# Patient Record
Sex: FEMALE | Race: WHITE | HISPANIC OR LATINO | Employment: OTHER | URBAN - METROPOLITAN AREA
[De-identification: names, ages, dates, MRNs, and addresses within clinical notes are randomized per-mention and may not be internally consistent; named-entity substitution may affect disease eponyms.]

---

## 2023-03-09 ENCOUNTER — TELEPHONE (OUTPATIENT)
Dept: PRIMARY CARE | Facility: CLINIC | Age: 78
End: 2023-03-09
Payer: MEDICARE

## 2023-03-09 NOTE — TELEPHONE ENCOUNTER
Radiology called to verify what the pt needs to be getting done. They stated that Dr. Salinas ordered both:  MRI Breast Bilateral (self pay)  MRI Breast Unilateral with and without contrast of the Right    Radiology needs clarification of what needs done. They are requesting that MA calls pt directly to confirm to pt what she needs to schedule. Thank you.    [phone]

## 2023-06-15 ENCOUNTER — TELEPHONE (OUTPATIENT)
Dept: PRIMARY CARE | Facility: CLINIC | Age: 78
End: 2023-06-15
Payer: MEDICARE

## 2023-06-15 DIAGNOSIS — B37.0 ORAL THRUSH: Primary | ICD-10-CM

## 2023-06-15 RX ORDER — NYSTATIN 100000 [USP'U]/ML
500000 SUSPENSION ORAL 4 TIMES DAILY
Qty: 300 ML | Refills: 0 | Status: SHIPPED | OUTPATIENT
Start: 2023-06-15 | End: 2023-08-14

## 2023-08-25 LAB
CHOLESTEROL, TOTAL(NMR): 222 MG/DL (ref 100–199)
HDL-C(NMR): 64 MG/DL
HDL-P (TOTAL)(NMR): 37.2 UMOL/L
LDL AND HDL PARTICLES -DATA CONVERSION: ABNORMAL
LDL SIZE(NMR): 21.2 NM
LDL-C (NIH CALC)(NMR): 139 MG/DL (ref 0–99)
LDL-P(NMR): 1584 NMOL/L
LP-IR SCORE(NMR): 36
SMALL LDL-P(NMR): 518 NMOL/L
TRIGLYCERIDES(NMR): 107 MG/DL (ref 0–149)

## 2023-11-09 DIAGNOSIS — Z95.0 CARDIAC PACEMAKER IN SITU: Primary | ICD-10-CM

## 2023-11-09 DIAGNOSIS — I44.2 ATRIOVENTRICULAR BLOCK, COMPLETE (MULTI): ICD-10-CM

## 2023-12-01 ENCOUNTER — LAB (OUTPATIENT)
Dept: LAB | Facility: LAB | Age: 78
End: 2023-12-01
Payer: MEDICARE

## 2023-12-01 DIAGNOSIS — M81.0 AGE-RELATED OSTEOPOROSIS WITHOUT CURRENT PATHOLOGICAL FRACTURE: ICD-10-CM

## 2023-12-01 DIAGNOSIS — E78.5 HYPERLIPIDEMIA, UNSPECIFIED: Primary | ICD-10-CM

## 2023-12-01 DIAGNOSIS — R41.89 OTHER SYMPTOMS AND SIGNS INVOLVING COGNITIVE FUNCTIONS AND AWARENESS: ICD-10-CM

## 2023-12-01 LAB
ALBUMIN SERPL BCP-MCNC: 4 G/DL (ref 3.4–5)
ALP SERPL-CCNC: 56 U/L (ref 33–136)
ALT SERPL W P-5'-P-CCNC: 26 U/L (ref 7–45)
ANION GAP SERPL CALC-SCNC: 9 MMOL/L (ref 10–20)
AST SERPL W P-5'-P-CCNC: 22 U/L (ref 9–39)
BASOPHILS # BLD AUTO: 0.04 X10*3/UL (ref 0–0.1)
BASOPHILS NFR BLD AUTO: 0.7 %
BILIRUB SERPL-MCNC: 0.5 MG/DL (ref 0–1.2)
BUN SERPL-MCNC: 15 MG/DL (ref 6–23)
CALCIUM SERPL-MCNC: 9.4 MG/DL (ref 8.6–10.3)
CHLORIDE SERPL-SCNC: 105 MMOL/L (ref 98–107)
CO2 SERPL-SCNC: 29 MMOL/L (ref 21–32)
CREAT SERPL-MCNC: 0.72 MG/DL (ref 0.5–1.05)
EOSINOPHIL # BLD AUTO: 0.1 X10*3/UL (ref 0–0.4)
EOSINOPHIL NFR BLD AUTO: 1.7 %
ERYTHROCYTE [DISTWIDTH] IN BLOOD BY AUTOMATED COUNT: 13.8 % (ref 11.5–14.5)
GFR SERPL CREATININE-BSD FRML MDRD: 85 ML/MIN/1.73M*2
GLUCOSE SERPL-MCNC: 89 MG/DL (ref 74–99)
HCT VFR BLD AUTO: 40.6 % (ref 36–46)
HGB BLD-MCNC: 13.2 G/DL (ref 12–16)
IMM GRANULOCYTES # BLD AUTO: 0.01 X10*3/UL (ref 0–0.5)
IMM GRANULOCYTES NFR BLD AUTO: 0.2 % (ref 0–0.9)
LYMPHOCYTES # BLD AUTO: 1.14 X10*3/UL (ref 0.8–3)
LYMPHOCYTES NFR BLD AUTO: 19.1 %
MCH RBC QN AUTO: 33.5 PG (ref 26–34)
MCHC RBC AUTO-ENTMCNC: 32.5 G/DL (ref 32–36)
MCV RBC AUTO: 103 FL (ref 80–100)
MONOCYTES # BLD AUTO: 0.78 X10*3/UL (ref 0.05–0.8)
MONOCYTES NFR BLD AUTO: 13.1 %
NEUTROPHILS # BLD AUTO: 3.89 X10*3/UL (ref 1.6–5.5)
NEUTROPHILS NFR BLD AUTO: 65.2 %
NRBC BLD-RTO: 0 /100 WBCS (ref 0–0)
PLATELET # BLD AUTO: 234 X10*3/UL (ref 150–450)
POTASSIUM SERPL-SCNC: 4.1 MMOL/L (ref 3.5–5.3)
PROT SERPL-MCNC: 6.6 G/DL (ref 6.4–8.2)
RBC # BLD AUTO: 3.94 X10*6/UL (ref 4–5.2)
SODIUM SERPL-SCNC: 139 MMOL/L (ref 136–145)
WBC # BLD AUTO: 6 X10*3/UL (ref 4.4–11.3)

## 2023-12-01 PROCEDURE — 80053 COMPREHEN METABOLIC PANEL: CPT

## 2023-12-01 PROCEDURE — 85025 COMPLETE CBC W/AUTO DIFF WBC: CPT

## 2023-12-01 PROCEDURE — 36415 COLL VENOUS BLD VENIPUNCTURE: CPT

## 2023-12-04 ENCOUNTER — TELEMEDICINE (OUTPATIENT)
Dept: GASTROENTEROLOGY | Facility: CLINIC | Age: 78
End: 2023-12-04
Payer: MEDICARE

## 2023-12-04 DIAGNOSIS — R13.12 OROPHARYNGEAL DYSPHAGIA: Primary | ICD-10-CM

## 2023-12-04 PROBLEM — G90.9 AUTONOMIC NEUROPATHY: Status: ACTIVE | Noted: 2023-12-04

## 2023-12-04 PROBLEM — E04.1 THYROID NODULE: Status: ACTIVE | Noted: 2018-08-08

## 2023-12-04 PROBLEM — G25.0 ESSENTIAL TREMOR: Status: ACTIVE | Noted: 2023-12-04

## 2023-12-04 PROBLEM — R41.9 COGNITIVE COMPLAINTS: Status: ACTIVE | Noted: 2023-12-04

## 2023-12-04 PROBLEM — R12 HEARTBURN: Status: ACTIVE | Noted: 2023-12-04

## 2023-12-04 PROBLEM — R13.10 DYSPHAGIA: Status: ACTIVE | Noted: 2023-12-04

## 2023-12-04 PROBLEM — I44.2 COMPLETE ATRIOVENTRICULAR BLOCK (MULTI): Status: ACTIVE | Noted: 2021-06-09

## 2023-12-04 PROBLEM — I10 BENIGN ESSENTIAL HTN: Status: ACTIVE | Noted: 2023-12-04

## 2023-12-04 PROBLEM — R79.89 TROPONIN LEVEL ELEVATED: Status: ACTIVE | Noted: 2023-12-04

## 2023-12-04 PROBLEM — I44.30 PARTIAL AV BLOCK: Status: ACTIVE | Noted: 2023-12-04

## 2023-12-04 PROBLEM — H91.90 HEARING LOSS: Status: ACTIVE | Noted: 2020-10-13

## 2023-12-04 PROBLEM — G62.9 PERIPHERAL POLYNEUROPATHY: Status: ACTIVE | Noted: 2023-12-04

## 2023-12-04 PROBLEM — R94.6 ABNORMAL THYROID SCAN: Status: ACTIVE | Noted: 2023-12-04

## 2023-12-04 PROBLEM — R41.89 COGNITIVE CHANGE: Status: ACTIVE | Noted: 2019-09-24

## 2023-12-04 PROBLEM — Z95.0 PACEMAKER: Status: ACTIVE | Noted: 2023-12-04

## 2023-12-04 PROBLEM — R42 POSTURAL DIZZINESS: Status: ACTIVE | Noted: 2023-12-04

## 2023-12-04 PROBLEM — I44.7 LBBB (LEFT BUNDLE BRANCH BLOCK): Status: ACTIVE | Noted: 2023-12-04

## 2023-12-04 PROBLEM — I35.8 AORTIC VALVE SCLEROSIS: Status: ACTIVE | Noted: 2023-12-04

## 2023-12-04 PROBLEM — I45.5 SINUS PAUSE: Status: ACTIVE | Noted: 2018-10-12

## 2023-12-04 PROBLEM — M25.551 BILATERAL HIP PAIN: Status: ACTIVE | Noted: 2023-12-04

## 2023-12-04 PROBLEM — E04.9 GOITER: Status: ACTIVE | Noted: 2018-07-09

## 2023-12-04 PROBLEM — Z86.16 PERSONAL HISTORY OF COVID-19: Status: ACTIVE | Noted: 2023-12-04

## 2023-12-04 PROBLEM — R55 NEAR SYNCOPE: Status: ACTIVE | Noted: 2023-12-04

## 2023-12-04 PROBLEM — R55 SYNCOPE: Status: ACTIVE | Noted: 2018-10-11

## 2023-12-04 PROBLEM — R05.9 COUGH: Status: ACTIVE | Noted: 2023-12-04

## 2023-12-04 PROBLEM — R19.7 DIARRHEA: Status: ACTIVE | Noted: 2023-12-04

## 2023-12-04 PROBLEM — U09.9 POST-COVID CHRONIC COUGH: Status: ACTIVE | Noted: 2023-12-04

## 2023-12-04 PROBLEM — R00.0 WIDE-COMPLEX TACHYCARDIA: Status: ACTIVE | Noted: 2018-10-12

## 2023-12-04 PROBLEM — R42 LIGHTHEADEDNESS: Status: ACTIVE | Noted: 2023-12-04

## 2023-12-04 PROBLEM — M81.0 AGE-RELATED OSTEOPOROSIS WITHOUT CURRENT PATHOLOGICAL FRACTURE: Status: ACTIVE | Noted: 2019-09-24

## 2023-12-04 PROBLEM — R05.3 POST-COVID CHRONIC COUGH: Status: ACTIVE | Noted: 2023-12-04

## 2023-12-04 PROBLEM — M25.552 BILATERAL HIP PAIN: Status: ACTIVE | Noted: 2023-12-04

## 2023-12-04 PROBLEM — C82.92: Status: ACTIVE | Noted: 2023-12-04

## 2023-12-04 PROCEDURE — 99213 OFFICE O/P EST LOW 20 MIN: CPT | Performed by: REGISTERED NURSE

## 2023-12-04 RX ORDER — OMEPRAZOLE 20 MG/1
1 CAPSULE, DELAYED RELEASE ORAL
COMMUNITY
Start: 2023-03-28 | End: 2023-12-27 | Stop reason: ALTCHOICE

## 2023-12-04 RX ORDER — CALCIUM CARBONATE 500(1250)
TABLET ORAL
COMMUNITY

## 2023-12-04 RX ORDER — SCOLOPAMINE TRANSDERMAL SYSTEM 1 MG/1
PATCH, EXTENDED RELEASE TRANSDERMAL
COMMUNITY
Start: 2019-09-25

## 2023-12-04 RX ORDER — ROSUVASTATIN CALCIUM 10 MG/1
10 TABLET, COATED ORAL
COMMUNITY
Start: 2021-07-27

## 2023-12-04 RX ORDER — MELATONIN 10 MG
TABLET, SUBLINGUAL SUBLINGUAL
COMMUNITY

## 2023-12-04 RX ORDER — ASPIRIN 81 MG/1
81 TABLET ORAL 2 TIMES WEEKLY
COMMUNITY
Start: 2014-03-11

## 2023-12-04 RX ORDER — PYRIDOXINE HCL (VITAMIN B6) 250 MG
TABLET ORAL
COMMUNITY

## 2023-12-04 RX ORDER — CARBAMAZEPINE 100 MG/1
100 CAPSULE, EXTENDED RELEASE ORAL 2 TIMES DAILY
COMMUNITY
Start: 2021-09-07 | End: 2023-12-27 | Stop reason: ALTCHOICE

## 2023-12-04 RX ORDER — ALENDRONATE SODIUM 70 MG/1
TABLET ORAL
COMMUNITY
Start: 2023-11-06

## 2023-12-04 RX ORDER — MAGNESIUM GLUCONATE
POWDER (GRAM) MISCELLANEOUS
COMMUNITY
Start: 2017-09-22

## 2023-12-04 RX ORDER — VALACYCLOVIR HYDROCHLORIDE 500 MG/1
1 TABLET, FILM COATED ORAL 2 TIMES DAILY
COMMUNITY

## 2023-12-04 ASSESSMENT — ENCOUNTER SYMPTOMS
DIFFICULTY URINATING: 0
ENDOCRINE NEGATIVE: 1
COUGH: 0
MYALGIAS: 0
APPETITE CHANGE: 0
JOINT SWELLING: 0
EYES NEGATIVE: 1
EYE PAIN: 0
SHORTNESS OF BREATH: 0
NERVOUS/ANXIOUS: 0
ARTHRALGIAS: 0
UNEXPECTED WEIGHT CHANGE: 0

## 2023-12-04 NOTE — PROGRESS NOTES
"REASON FOR VISIT:  Patient states she had dysphagia for approximately one week after Thanksgiving and wanted to talk to provider. Same thing happened last Thanksgiving       HPI:  Harmeet Conner is a 78 y.o. female who presents for oropharyngeal dysphagia.  States that she had discomfort in the back of her throat and upper chest for approximately 10 days after Thanksgiving.  States that it hurt to swallow.  Her symptoms have now resolved.  She is no longer taking Prilosec.  Shestoppes a few months ago. The patient was seen in January 2023 for dysphagia and had an EGD and colonoscopy in March 2023.  She had a modified barium swallow study in April 2023.    States that she is feeling better.  States that she feels food sometimes getting stuck in the back of her throat under her jaw.  Denies esophageal dysphagia, regurgitation, heartburn, food impaction, epigastric pain, nausea or vomiting.    She denies NSAID use.  She does not use tobacco products or drink alcohol.  She does not drink caffeinated beverages eat fried food or spicy food.    Med list notable for - FOSAMAX     Labs   Lab Results   Component Value Date    WBC 6.0 12/01/2023    HGB 13.2 12/01/2023    HCT 40.6 12/01/2023     (H) 12/01/2023     12/01/2023     Lab Results   Component Value Date     12/01/2023    K 4.1 12/01/2023     12/01/2023    CO2 29 12/01/2023    BUN 15 12/01/2023    CREATININE 0.72 12/01/2023    GLUCOSE 89 12/01/2023    CALCIUM 9.4 12/01/2023      Lab Results   Component Value Date    ALKPHOS 56 12/01/2023    ALKPHOS 48 09/27/2021    ALKPHOS 57 09/09/2019    BILITOT 0.5 12/01/2023    BILITOT 0.4 09/27/2021    BILITOT 0.5 09/09/2019    PROT 6.6 12/01/2023    PROT 6.5 09/27/2021    PROT 6.2 (L) 05/11/2021    ALT 26 12/01/2023    ALT 21 09/27/2021    ALT 25 09/09/2019    AST 22 12/01/2023    AST 23 09/27/2021    AST 30 09/09/2019      No results found for: \"INR\"   No results found for: \"IRON\", \"TIBC\"   No results " "found for: \"FERRITIN\"   Lab Results   Component Value Date    TSH 1.89 08/27/2020      No fhx of CRC.     Prev endoscopic eval:     >> Colonoscopy - 3/14/23- Dr Orellana -  - Diverticulosis. There was no evidence of diverticular bleeding.                         - External and internal hemorrhoids.                         - The examination was otherwise normal on direct and                          retroflexion views.                         - No specimens collected.  Recommendation:                              - Resume previous diet.                         - Continue present medications.                         - No repeat colonoscopy.    >> EGD - 3/14/23- Dr Orellana -    - Normal duodenal bulb and second portion of the                          duodenum.                         - Gastritis. Biopsied.                         - Z-line regular, 35 cm from the incisors.                         - Normal esophagus. Biopsied.                         - The examination was otherwise normal.  Recommendation:        - Patient has a contact number available for                          emergencies. The signs and symptoms of potential                          delayed complications were discussed with the patient.                          Return to normal activities tomorrow. Written                          discharge instructions were provided to the patient.                         - Resume previous diet.                         - Continue present medications.                         - Await pathology results. If it is normal then can                          consider EMOT/ MBS    >> MODIFIED BARIUM SWALLOW:    4/25/23 -with pharyngeal speech evaluation    Diet recommendations/feeding strategies: Per the results of today's  MBSS, patient to continue baseline diet of regular consistencies and  thin liquids following swallow strategies listed below.  Compensatory Swallow Strategies:  -alternate consistencies  -add moisture to " problematic foods  -chew food thoroughly  -second dry swallow following solids  -follow reflux precautions     Suspect esophageal dysphagia due to upper-mid esophageal residue,  mild backflow and reduced bolus transit time following regular  solids. However, liquid wash was successful in clearing most of  residue.       REVIEW OF SYSTEMS    Review of Systems   Constitutional:  Negative for appetite change and unexpected weight change.   HENT:  Negative for mouth sores.    Eyes: Negative.  Negative for pain and visual disturbance.   Respiratory:  Negative for cough and shortness of breath.    Cardiovascular:  Negative for chest pain.   Endocrine: Negative.    Genitourinary:  Negative for difficulty urinating.   Musculoskeletal:  Negative for arthralgias, joint swelling and myalgias.   Skin:  Negative for rash.   Allergic/Immunologic: Negative for environmental allergies and food allergies.   Psychiatric/Behavioral:  The patient is not nervous/anxious.           Allergies   Allergen Reactions    Iodinated Contrast Media Unknown       Past Medical History:   Diagnosis Date    Encounter for immunization 10/07/2020    Encounter for immunization    Encounter for immunization 09/24/2021    Encounter for immunization    Lesion of sciatic nerve, right lower limb     Piriformis syndrome of right side    Other chest pain 03/18/2022    Atypical chest pain    Other chest pain 10/01/2021    Chest discomfort    Personal history of malignant neoplasm of breast     History of malignant neoplasm of breast    Personal history of non-Hodgkin lymphomas     History of non-Hodgkin's lymphoma    Personal history of other diseases of the circulatory system 03/18/2022    History of orthostatic hypotension    Personal history of other diseases of the musculoskeletal system and connective tissue     History of low back pain    Personal history of other diseases of the nervous system and sense organs     History of hearing loss    Personal  history of other diseases of the nervous system and sense organs     History of tinnitus    Personal history of other endocrine, nutritional and metabolic disease     History of hyperlipidemia    Personal history of other infectious and parasitic diseases 08/30/2019    History of herpes labialis    Unspecified atrioventricular block     Partial AV block       Past Surgical History:   Procedure Laterality Date    OTHER SURGICAL HISTORY  08/05/2019    Tonsillectomy    OTHER SURGICAL HISTORY  08/05/2019    Cholecystectomy    OTHER SURGICAL HISTORY  02/24/2022    Mastectomy    OTHER SURGICAL HISTORY  02/24/2022    Complete colonoscopy    OTHER SURGICAL HISTORY  02/24/2022    Pacemaker insertion    OTHER SURGICAL HISTORY  02/24/2022    Thoracotomy    OTHER SURGICAL HISTORY  08/27/2020    Colonoscopy       No family history on file.    Social History     Tobacco Use    Smoking status: Not on file    Smokeless tobacco: Not on file   Substance Use Topics    Alcohol use: Not on file       Current Outpatient Medications   Medication Sig Dispense Refill    alendronate (Fosamax) 70 mg tablet once per week in am, 8oz water, empty stomach, nothing by mouth and do not lie down for the next 30 minutes.      aspirin 81 mg EC tablet Take 1 tablet (81 mg) by mouth 2 times a week.      carBAMazepine (Carbatrol) 100 mg 12 hr capsule Take 1 capsule (100 mg) by mouth twice a day.      DOCOSAHEXAENOIC ACID ORAL Take 2 capsules by mouth once daily.      magnesium gluconate, bulk, powder Transdermal.      omeprazole (PriLOSEC) 20 mg DR capsule Take 1 capsule (20 mg) by mouth once daily in the morning. Take before meals.      rosuvastatin (Crestor) 10 mg tablet Take 1 tablet (10 mg) by mouth once daily.      scopolamine (Transderm-Scop) 1 mg over 3 days patch 3 day PLACE 1 PATCH EVERY 3 DAYS      VITAMIN B COMPLEX ORAL Take 1 capsule by mouth once daily.      amino acids-protein supplement tablet Take by mouth once daily.      calcium  carbonate (Oscal) 500 mg calcium (1,250 mg) tablet Take by mouth.      cholecalciferol, vitamin D3, 250 mcg (10,000 unit) tablet Take by mouth.      pyridoxine (Vitamin B-6) 250 mg tablet Take by mouth.      valACYclovir (Valtrex) 500 mg tablet Take 1 tablet (500 mg) by mouth 2 times a day.       No current facility-administered medications for this visit.       PHYSICAL EXAM:  There were no vitals taken for this visit.     Physical Exam     No physical exam was conducted today.  Patient is here for virtual telemedicine telephone visit.  She appears to be in no acute distress    ASSESSMENT    Dysphagia     PLAN    Eat slowly and chew food thoroughly at mealtime. Drink  2 to 4 ounces of liquid when you are eating.  Cut meat into small pieces.  Try to avoid eating hard crusty bread.  2.   Recommend purchasing over-the-counter Prilosec 20 mg and taking this medication 30 minutes before supper every day for 2 weeks.  If you are feeling better but I would recommend continuing the medication for 8 to 12 weeks and then stop.  3.  If you have recurrent episodes of difficulty swallowing, feeling of food getting stuck in youir chest.  Then call the office at [phone] and I will put in orders for an esophageal motility study that can be done at [facility]  4.  If you have an incident of food impaction when you are eating, can be painful and cannot breathe well and you need to call 911 and go to the nearest emergency room  5.  Spoke to patient for 22 minutes and 40 seconds for virtual telephone telemedicine visit today.  Chart review, prep and dictation 10 minutes-total time 32 minutes and 40 seconds              Jose Navarro MA      Date: 12/4/2023  Time: 12:43 PM

## 2023-12-26 ENCOUNTER — LAB (OUTPATIENT)
Dept: LAB | Facility: LAB | Age: 78
End: 2023-12-26
Payer: MEDICARE

## 2023-12-26 DIAGNOSIS — D51.8 OTHER VITAMIN B12 DEFICIENCY ANEMIAS: Primary | ICD-10-CM

## 2023-12-26 LAB
CRP SERPL-MCNC: 0.31 MG/DL
FOLATE SERPL-MCNC: >22.3 NG/ML
VIT B12 SERPL-MCNC: 706 PG/ML (ref 211–911)

## 2023-12-26 PROCEDURE — 86140 C-REACTIVE PROTEIN: CPT

## 2023-12-26 PROCEDURE — 82607 VITAMIN B-12: CPT

## 2023-12-26 PROCEDURE — 83090 ASSAY OF HOMOCYSTEINE: CPT | Mod: WAIVER OF LIABILITY ON FILE

## 2023-12-26 PROCEDURE — 36415 COLL VENOUS BLD VENIPUNCTURE: CPT

## 2023-12-26 PROCEDURE — 82746 ASSAY OF FOLIC ACID SERUM: CPT

## 2023-12-27 ENCOUNTER — OFFICE VISIT (OUTPATIENT)
Dept: PRIMARY CARE | Facility: CLINIC | Age: 78
End: 2023-12-27
Payer: MEDICARE

## 2023-12-27 VITALS
OXYGEN SATURATION: 97 % | HEART RATE: 78 BPM | DIASTOLIC BLOOD PRESSURE: 68 MMHG | RESPIRATION RATE: 16 BRPM | HEIGHT: 64 IN | SYSTOLIC BLOOD PRESSURE: 108 MMHG | TEMPERATURE: 97.8 F | BODY MASS INDEX: 20.49 KG/M2 | WEIGHT: 120 LBS

## 2023-12-27 DIAGNOSIS — Z00.00 ROUTINE GENERAL MEDICAL EXAMINATION AT HEALTH CARE FACILITY: Primary | ICD-10-CM

## 2023-12-27 DIAGNOSIS — R25.1 TREMOR: ICD-10-CM

## 2023-12-27 DIAGNOSIS — Z00.00 HEALTHCARE MAINTENANCE: ICD-10-CM

## 2023-12-27 DIAGNOSIS — N39.44 NOCTURNAL ENURESIS: ICD-10-CM

## 2023-12-27 LAB — HCYS SERPL-SCNC: 6.61 UMOL/L (ref 5–13.9)

## 2023-12-27 PROCEDURE — 3074F SYST BP LT 130 MM HG: CPT | Performed by: FAMILY MEDICINE

## 2023-12-27 PROCEDURE — 3078F DIAST BP <80 MM HG: CPT | Performed by: FAMILY MEDICINE

## 2023-12-27 PROCEDURE — G0439 PPPS, SUBSEQ VISIT: HCPCS | Performed by: FAMILY MEDICINE

## 2023-12-27 PROCEDURE — 1036F TOBACCO NON-USER: CPT | Performed by: FAMILY MEDICINE

## 2023-12-27 PROCEDURE — 1170F FXNL STATUS ASSESSED: CPT | Performed by: FAMILY MEDICINE

## 2023-12-27 RX ORDER — PROPRANOLOL HYDROCHLORIDE 20 MG/1
20 TABLET ORAL 2 TIMES DAILY
Qty: 60 TABLET | Refills: 5 | Status: SHIPPED | OUTPATIENT
Start: 2023-12-27 | End: 2024-06-24

## 2023-12-27 RX ORDER — ATENOLOL 25 MG/1
25 TABLET ORAL
COMMUNITY
Start: 2021-07-27

## 2023-12-27 ASSESSMENT — PATIENT HEALTH QUESTIONNAIRE - PHQ9
1. LITTLE INTEREST OR PLEASURE IN DOING THINGS: NOT AT ALL
SUM OF ALL RESPONSES TO PHQ9 QUESTIONS 1 AND 2: 0
2. FEELING DOWN, DEPRESSED OR HOPELESS: NOT AT ALL

## 2023-12-27 ASSESSMENT — ACTIVITIES OF DAILY LIVING (ADL)
DOING_HOUSEWORK: INDEPENDENT
TAKING_MEDICATION: INDEPENDENT
DRESSING: INDEPENDENT
GROCERY_SHOPPING: INDEPENDENT
BATHING: INDEPENDENT
MANAGING_FINANCES: INDEPENDENT

## 2023-12-27 ASSESSMENT — ENCOUNTER SYMPTOMS
DEPRESSION: 0
OCCASIONAL FEELINGS OF UNSTEADINESS: 0
LOSS OF SENSATION IN FEET: 0

## 2023-12-27 NOTE — PROGRESS NOTES
"Subjective     Patient ID: Harmeet Conner is a 78 y.o. female who presents for Medicare Annual Wellness Visit Subsequent.  HPI Has had three episodes where she has awakened from sleep due to having and episode of nocturnal enuresis.      Review of Systems    Objective     Vitals:    12/27/23 1036   BP: 108/68   BP Location: Right arm   Pulse: 78   Resp: 16   Temp: 36.6 °C (97.8 °F)   SpO2: 97%   Weight: 54.4 kg (120 lb)   Height: 1.626 m (5' 4\")        Current Outpatient Medications   Medication Instructions    alendronate (Fosamax) 70 mg tablet once per week in am, 8oz water, empty stomach, nothing by mouth and do not lie down for the next 30 minutes.    amino acids-protein supplement tablet oral, Daily RT    aspirin 81 mg, oral, 2 times weekly    atenolol (TENORMIN) 25 mg, oral, Daily RT    calcium carbonate (Oscal) 500 mg calcium (1,250 mg) tablet oral    carBAMazepine (CARBATROL) 100 mg, oral, 2 times daily    cholecalciferol, vitamin D3, 250 mcg (10,000 unit) tablet oral    DOCOSAHEXAENOIC ACID ORAL 2 capsules, oral, Daily RT    magnesium gluconate, bulk, powder Transdermal.    omeprazole (PriLOSEC) 20 mg DR capsule 1 capsule, oral, Daily before breakfast    pyridoxine (Vitamin B-6) 250 mg tablet oral    rosuvastatin (CRESTOR) 10 mg, oral, Daily RT    scopolamine (Transderm-Scop) 1 mg over 3 days patch 3 day PLACE 1 PATCH EVERY 3 DAYS    valACYclovir (Valtrex) 500 mg tablet 1 tablet, oral, 2 times daily    VITAMIN B COMPLEX ORAL 1 capsule, oral, Daily RT        Physical Exam  Constitutional:       General: She is not in acute distress.     Appearance: Normal appearance.   HENT:      Head: Normocephalic and atraumatic.      Right Ear: Tympanic membrane normal.      Left Ear: Tympanic membrane normal.      Nose: Nose normal.      Mouth/Throat:      Pharynx: Oropharynx is clear.   Eyes:      Conjunctiva/sclera: Conjunctivae normal.      Pupils: Pupils are equal, round, and reactive to light.   Neck:      Vascular: " CYSTO No carotid bruit.   Cardiovascular:      Rate and Rhythm: Normal rate and regular rhythm.      Heart sounds: Murmur heard.   Pulmonary:      Effort: Pulmonary effort is normal.      Breath sounds: Normal breath sounds.   Abdominal:      General: Bowel sounds are normal.      Palpations: Abdomen is soft.   Musculoskeletal:      Cervical back: Neck supple. No tenderness.   Skin:     General: Skin is warm and dry.   Neurological:      General: No focal deficit present.      Mental Status: She is alert.   Psychiatric:         Mood and Affect: Mood normal.         Behavior: Behavior normal.         Assessment/Plan   Problem List Items Addressed This Visit    None  Visit Diagnoses         Codes    Routine general medical examination at health care facility    -  Primary Z00.00    Healthcare maintenance     Z00.00    Nocturnal enuresis     N39.44    Relevant Orders    Referral to Urogynecology    Tremor     R25.1

## 2023-12-28 ENCOUNTER — HOSPITAL ENCOUNTER (OUTPATIENT)
Dept: CARDIOLOGY | Facility: HOSPITAL | Age: 78
Discharge: HOME | End: 2023-12-28
Payer: MEDICARE

## 2023-12-28 DIAGNOSIS — Z95.0 CARDIAC PACEMAKER IN SITU: ICD-10-CM

## 2023-12-28 DIAGNOSIS — I44.2 ATRIOVENTRICULAR BLOCK, COMPLETE (MULTI): ICD-10-CM

## 2023-12-28 PROCEDURE — 93296 REM INTERROG EVL PM/IDS: CPT

## 2023-12-28 PROCEDURE — 93294 REM INTERROG EVL PM/LDLS PM: CPT | Performed by: INTERNAL MEDICINE

## 2023-12-29 LAB — SCAN RESULT: NORMAL

## 2024-04-03 ENCOUNTER — HOSPITAL ENCOUNTER (OUTPATIENT)
Dept: CARDIOLOGY | Facility: HOSPITAL | Age: 79
Discharge: HOME | End: 2024-04-03
Payer: MEDICARE

## 2024-04-03 DIAGNOSIS — I44.2 ATRIOVENTRICULAR BLOCK, COMPLETE (MULTI): ICD-10-CM

## 2024-04-03 DIAGNOSIS — Z95.0 CARDIAC PACEMAKER IN SITU: ICD-10-CM

## 2024-04-03 PROCEDURE — 93296 REM INTERROG EVL PM/IDS: CPT

## 2024-04-03 PROCEDURE — 93294 REM INTERROG EVL PM/LDLS PM: CPT | Performed by: INTERNAL MEDICINE

## 2024-05-01 NOTE — PROGRESS NOTES
Patient ID: Harmeet Conner is a 78 y.o. female.    Oncology History Overview Note  1. 1961: NHL s/p 3600 centigray of radiation (cobalt) to the left chest during a 3 week period at the age of 15 years.  2. March 2000: Left breast: 3 cm invasive lobular carcinoma, %, RI 95%, HER2 2+, 1/6 LN+ (questionable focus in second LN), s/p MRM.  3. May 2000: AC x 4 followed by Taxol x 4 q 3 weeks adjuvant.  4. November 2000: Tamoxifen 20mg daily.  5. August 2005 - July 2010: Femara 2.5mg daily.  6. 2013 -23andme testing and was found to be negative for any significant genetic markers    Subjective    HPI  Chief Complaint: history of left  breast cancer and non-Hodgkin's lymphoma    Ms. Harmeet Littlejohn is a 77 y/o F presenting for follow up for a history of left breast cancer and non-Hodgkin's lymphoma. History notable for Non-Hodgkin lymphoma (cobalt rad to L chest 1961) early stage breast cancer (2000 : T2N1 ER+RI+ Her2- invasive ca L s/p mastectomy adjuvant ACT and endocrine tamoxifen. Additional history includes syncope w intermittent CHB, HTN, DLP, OP.     Patient was initially diagnosed with non-Hodgkin's lymphoma at age 15 in 1951 status post radiation alone. Then in March 2000, she developed left breat cancer.  At that time, it was a 3 cm invasive lobular carcinoma, %, RI 95%, HER2 2+ with 1/6  lymph nodes involved. She was diagnosed at [facility] and underwent a modified ratified mastectomy followed by Adriamycin plus Cytoxan for 4 cycles and then Taxol every 3 weeks for 4 cycles. She was started on tamoxifen in November 2000 and then switched  to Femara in August 2005 which she continued until July 2010. She did undergo 23 and Me genetic testing in 2013 that was negative.      Patient reports that she has had residual breathing discomfort post COVID 2.5 years ago. She also reports some dysphagia that she is currently being worked up for. She is on low-dose omeprazole. She also reports feeling more fatigued  "lately. She denies  any pain. No new lumps or bumps.      Interval History:   2024:  Patient presents for annual follow-up.  She reports that she has been feeling great.  She states that she has had no symptoms including chest pain, palpitations, shortness of breath, fevers, chills, unintentional weight loss or appetite changes, bone pain, nausea, vomiting, constipation, diarrhea, signs of bleeding, new lumps or bumps, changes with her breast or chest wall, edema or unusual headaches.  She states that her energy is good and that she remains active.  She reports that her dysphagia is just episodic and that her breathing has improved.    Patient has questions regarding gadolinium levels that she had tested in her blood that came back elevated and wondering if she should still continue breast MRIs based on these results.  She did not have her breast MRI as scheduled due to wanting to discuss this with us, it also appears per our system that she may be due for her right-sided screening mammogram as well.  Patient did see our onco-cardiologist in the past, she is aware that per that note she was recommended to follow-up in 1 year just for routine surveillance due to her history of radiation, patient states that she has no cardiac symptoms or concerns at this time.  We did discuss the importance of having her lipid levels monitored, routine exercise, healthy well-balanced diet, continuing not to smoke and limiting alcohol for heart health. She has her thyroid monitored routinely as well and follows with her primary provider routinely.     FHx: Maternal grandfather  of unknown cancer.     Social Hx: Never smoker.     Objective    Visit Vitals  /66 (BP Location: Right arm, Patient Position: Sitting, BP Cuff Size: Small adult)   Pulse 65   Temp 36.2 °C (97.2 °F) (Temporal)   Resp 16   Ht (S) 1.635 m (5' 4.37\")   Wt 54 kg (119 lb 0.8 oz)   SpO2 92%   BMI 20.20 kg/m²   Smoking Status Never   BSA 1.57 m² "       Physical Exam  Constitutional:       General: She is not in acute distress.     Appearance: Normal appearance.   HENT:      Head: Normocephalic.      Mouth/Throat:      Mouth: Mucous membranes are moist.      Pharynx: No oropharyngeal exudate or posterior oropharyngeal erythema.   Eyes:      General: No scleral icterus.     Pupils: Pupils are equal, round, and reactive to light.   Cardiovascular:      Rate and Rhythm: Normal rate and regular rhythm.   Pulmonary:      Effort: Pulmonary effort is normal. No respiratory distress.      Breath sounds: Normal breath sounds. No wheezing.   Chest:      Comments: Left breast is surgically removed. Left chest wall has no lumps or bumps, masses or skin rashes. Right breast is without firm nodules, skin dimpling or rashes. There is no nipple discharge. Right axillary area while laying down is slightly protruding but flatens when sitting up and is soft  Abdominal:      General: Abdomen is flat. Bowel sounds are normal. There is no distension.      Palpations: Abdomen is soft.      Tenderness: There is no abdominal tenderness.   Musculoskeletal:         General: Normal range of motion.      Cervical back: Normal range of motion and neck supple.   Skin:     General: Skin is warm and dry.   Neurological:      General: No focal deficit present.      Mental Status: She is alert and oriented to person, place, and time. Mental status is at baseline.      Motor: No weakness.      Gait: Gait normal.   Psychiatric:         Mood and Affect: Mood normal.         Behavior: Behavior normal.         Judgment: Judgment normal.       Assessment/Plan     1. Left breast invasive lobular carcinoma, 3 cm, %, ND 95, HER2 2+ with 1/6 lymph nodes     - Diagnosed in March 2000, status post modified radical mastectomy. AC x4 and Taxol every 3 weeks x4 completed in May 2000. Tamoxifen started November 2000. Completed Femara 2.5 from August 2005 to July 2010.   - Due to dense tissue she has  been alternating between breast MRI and mammograms every 6 months.   - Discussed given she is 20 years out from initial diagnosis and even longer from her history of radiation back in 1961 for non-Hodgkin's lymphoma (3600 centigrade), we will plan to do MRI of the breast once a year and mammograms likely every 2 years.  She was agreeable to this plan.  - She does have some SOB and would like a Pulmonology referral which we will place today with Dr. Willis. She is already followed by Dr. Figueredo of Cardiology.  - She does have a good understanding of her possible complications from large dose radiation in the past. She should have an annual lipid panel.  - RTC in 1 year with Juancho after breast MRI.        5/2/2024:  - Patient presents for annual follow-up  - There is no evidence of breast cancer recurrence today on clinical exam.   - She had routine labs done in December 2023, we will hold off on any additional blood work at this time  - Before proceeding with another breast MRI patient would like me to discuss the risks versus benefits with Dr. Churchill since patient had outside testing done showing elevated MRI dye/gadolinium in her bloodstream  - Patient also due for routine right breast screening mammogram  - Patient reports feeling great and has no complaints or concerns at this time  - Patient would like to go ahead and schedule a follow-up visit with Dr. Thompson for ongoing cardiac care in the setting of past radiation  -Patient will return to clinic in 1 year but we will talk in the meantime to review testing which I will order once I discussed with Dr. Churchill    Addendum 5/10/2024: Called patient to discuss that I did speak with Dr. Churchill and updated her on patient's gadolinium levels.  Dr. Churchill stated that even despite patient's gadolinium levels she does not recommend ongoing breast MRIs due to length of time patient is from her initial diagnosis and in addition to this I also consulted with a colleague  from our breast surgery team who reviewed patient's history and past testing.  That provider also stated that she does not see an indication for patient to continue with breast MRIs.  I left this information on patient's voicemail (called 2x) and stated that I will order her routine screening right breast mammogram in addition to right axillary ultrasound if indicated due to soft tissue area protruding while patient is laying flat with arm outstretched above her, I do feel as though this area is benign but ultrasound entered in case it is needed.  I will call patient with her results once I receive them.  In addition Dr. Greenwood stated there is no additional information to share regarding patient's past radiation exposure other than routine follow-up care that I reviewed with the patient during her visit.     Diagnoses and all orders for this visit:  Malignant neoplasm of left breast in female, estrogen receptor positive, unspecified site of breast (Multi)  -     Clinic Appointment Request Follow up; JAMIL STEWART; Future  -     Referral to Cardiology; Future         HARLEY Sam-CNP

## 2024-05-02 ENCOUNTER — OFFICE VISIT (OUTPATIENT)
Dept: HEMATOLOGY/ONCOLOGY | Facility: CLINIC | Age: 79
End: 2024-05-02
Payer: MEDICARE

## 2024-05-02 ENCOUNTER — OFFICE VISIT (OUTPATIENT)
Dept: OBSTETRICS AND GYNECOLOGY | Facility: CLINIC | Age: 79
End: 2024-05-02
Payer: MEDICARE

## 2024-05-02 VITALS
BODY MASS INDEX: 20.32 KG/M2 | SYSTOLIC BLOOD PRESSURE: 111 MMHG | WEIGHT: 119 LBS | HEART RATE: 76 BPM | HEIGHT: 64 IN | DIASTOLIC BLOOD PRESSURE: 56 MMHG

## 2024-05-02 VITALS
HEART RATE: 65 BPM | TEMPERATURE: 97.2 F | DIASTOLIC BLOOD PRESSURE: 66 MMHG | RESPIRATION RATE: 16 BRPM | HEIGHT: 64 IN | SYSTOLIC BLOOD PRESSURE: 134 MMHG | OXYGEN SATURATION: 92 % | WEIGHT: 119.05 LBS | BODY MASS INDEX: 20.32 KG/M2

## 2024-05-02 DIAGNOSIS — C50.912 MALIGNANT NEOPLASM OF LEFT BREAST IN FEMALE, ESTROGEN RECEPTOR POSITIVE, UNSPECIFIED SITE OF BREAST (MULTI): Primary | ICD-10-CM

## 2024-05-02 DIAGNOSIS — N39.44 NOCTURNAL ENURESIS: ICD-10-CM

## 2024-05-02 DIAGNOSIS — N32.81 OVERACTIVE BLADDER: Primary | ICD-10-CM

## 2024-05-02 DIAGNOSIS — Z17.0 MALIGNANT NEOPLASM OF LEFT BREAST IN FEMALE, ESTROGEN RECEPTOR POSITIVE, UNSPECIFIED SITE OF BREAST (MULTI): Primary | ICD-10-CM

## 2024-05-02 LAB
POC APPEARANCE, URINE: CLEAR
POC BILIRUBIN, URINE: NEGATIVE
POC BLOOD, URINE: ABNORMAL
POC COLOR, URINE: YELLOW
POC GLUCOSE, URINE: NEGATIVE MG/DL
POC KETONES, URINE: NEGATIVE MG/DL
POC LEUKOCYTES, URINE: NEGATIVE
POC NITRITE,URINE: NEGATIVE
POC PH, URINE: 7 PH
POC PROTEIN, URINE: NEGATIVE MG/DL
POC SPECIFIC GRAVITY, URINE: 1.01
POC UROBILINOGEN, URINE: 0.2 EU/DL

## 2024-05-02 PROCEDURE — 1126F AMNT PAIN NOTED NONE PRSNT: CPT | Performed by: OBSTETRICS & GYNECOLOGY

## 2024-05-02 PROCEDURE — 3074F SYST BP LT 130 MM HG: CPT | Performed by: OBSTETRICS & GYNECOLOGY

## 2024-05-02 PROCEDURE — 81003 URINALYSIS AUTO W/O SCOPE: CPT | Mod: QW | Performed by: OBSTETRICS & GYNECOLOGY

## 2024-05-02 PROCEDURE — 3078F DIAST BP <80 MM HG: CPT | Performed by: OBSTETRICS & GYNECOLOGY

## 2024-05-02 PROCEDURE — 99214 OFFICE O/P EST MOD 30 MIN: CPT | Performed by: OBSTETRICS & GYNECOLOGY

## 2024-05-02 PROCEDURE — 99204 OFFICE O/P NEW MOD 45 MIN: CPT | Performed by: OBSTETRICS & GYNECOLOGY

## 2024-05-02 PROCEDURE — 1159F MED LIST DOCD IN RCRD: CPT | Performed by: OBSTETRICS & GYNECOLOGY

## 2024-05-02 PROCEDURE — 1126F AMNT PAIN NOTED NONE PRSNT: CPT

## 2024-05-02 PROCEDURE — 99214 OFFICE O/P EST MOD 30 MIN: CPT

## 2024-05-02 PROCEDURE — 3075F SYST BP GE 130 - 139MM HG: CPT

## 2024-05-02 PROCEDURE — 3078F DIAST BP <80 MM HG: CPT

## 2024-05-02 PROCEDURE — 1159F MED LIST DOCD IN RCRD: CPT

## 2024-05-02 RX ORDER — TROSPIUM CHLORIDE 20 MG/1
TABLET, FILM COATED ORAL
Qty: 60 TABLET | Refills: 3 | Status: SHIPPED | OUTPATIENT
Start: 2024-05-02

## 2024-05-02 ASSESSMENT — ENCOUNTER SYMPTOMS
TREMORS: 1
DIARRHEA: 1
TROUBLE SWALLOWING: 1

## 2024-05-02 ASSESSMENT — PAIN SCALES - GENERAL
PAINLEVEL: 0-NO PAIN
PAINLEVEL: 0-NO PAIN

## 2024-05-02 NOTE — PROGRESS NOTES
Urogynecology  Provider:  Lupis Adhikari MD  [phone]              ASSESSMENT AND PLAN:   79 y/o new patient presenting with complaints of urinary urgency and frequency.    Diagnoses:  #1 OAB    Plan:  OAB  - Discussed etiology of OAB and potential management options.  - Reviewed bladder health recommendations (fluid intake, avoiding bladder triggers).  - Discussed OAB treatment options such as lifestyle changes, PFPT, medications, PTNS, intradetrusor Botox injections, or SNM.   - We discussed that PFPT may help with bladder/pelvic floor restrengthening exercises with an overall goal to better control the bladder and improve urinary symptoms. PFPT includes attending sessions with a specialized licensed female pelvic floor physical therapist who does external with internal vaginal work to ensure she is doing the correct exercises to obtain the most benefit of physical therapy. We reviewed the importance of continuing these home exercises to receive maximum benefit from PFPT. They also teach mind over bladder strategies/urge suppression techniques to reduce the intensity of the urge to void.   - We discussed that with starting an OAB medication the goal would be to allow the detrusor muscle around the bladder to relax and prevent the muscles from spasm/squeezing too frequently to allow the bladder to store more urine which reduces the urge, frequency, and incontinent episodes.   - We briefly discussed third-line therapies we offer for OAB including PTNS which is an in-office weekly procedure x30 minutes of percutaneous tibial nerve stimulation procedure x12 weeks which is a time commitment, intradetrusor Botox injection in which we inject Botox to the muscle the controls the bladder to allow it to relax to provider OAB sx relief for up to 3-12 months but there is a possibility of urinary retention in which she would need to be amenable to learning how to CIC until the retention resolves over time as the  effects of Botox wears off, and sacral neuromodulation where we do a PNE trial placing a temporary lead into the S3 sacral nerve that goes to the bowel/bladder in office and the patient evaluates after 5-7 days if they get 50% improvement in her urinary symptoms we would proceed with stage 2 of permanently placing the SNM device in the OR.   - Patient is amenable to pursuing medication.  - Sent Rx of Trospium 20mg to her preferred pharmacy with instructions to take 1 pill po BID. This medication will help improve bladder compliance by decreasing intensity of the bladder spasms thus improving urinary urgency. Discussed the drug profile being an anticholinergic and possible medication side effects including dry mouth, dry eyes, and constipation.    Return in 2 months to follow-up with Mary Welch NP at [facility]. Then, 2 months from that, return to follow-up with Dr. Adhikari.    Scribe Attestation  By signing my name below, IIsac, Scotty, attest that this documentation has been prepared under the direction and in the presence of Lupis Adhikari MD on 05/02/2024 at 10:36 AM.    Agree with above. I Dr. Adhikari, personally performed the services described in the documentation which was scribed virtually and confirm it is both complete and accurate.  Lupis Adhikari MD        Problem List Items Addressed This Visit    None          I spent a total of 45 minutes in face to face and non face to face time.      Lupis Adhikari MD              HISTORY OF PRESENT ILLNESS:   Sent in consultation by Dr. Salinas     Urinary Symptoms:   - The patient regularly has nocturia multiple times nightly and occasional enuresis.  - She also complains of urinary urgency and frequency.  - She mentions that she used to consistently get bladder infections and another physician told her it was possible her  was not keeping himself as clean as he probably should be. We do not necessarily agree with this  argument. She did, however, mention that she did not have UTIs for quite some time after the end of that marriage but now she presents with another infection.             Past Medical History:       Past Medical History:   Diagnosis Date    Encounter for immunization 10/07/2020    Encounter for immunization    Encounter for immunization 09/24/2021    Encounter for immunization    Lesion of sciatic nerve, right lower limb     Piriformis syndrome of right side    Other chest pain 03/18/2022    Atypical chest pain    Other chest pain 10/01/2021    Chest discomfort    Personal history of malignant neoplasm of breast     History of malignant neoplasm of breast    Personal history of non-Hodgkin lymphomas     History of non-Hodgkin's lymphoma    Personal history of other diseases of the circulatory system 03/18/2022    History of orthostatic hypotension    Personal history of other diseases of the musculoskeletal system and connective tissue     History of low back pain    Personal history of other diseases of the nervous system and sense organs     History of hearing loss    Personal history of other diseases of the nervous system and sense organs     History of tinnitus    Personal history of other endocrine, nutritional and metabolic disease     History of hyperlipidemia    Personal history of other infectious and parasitic diseases 08/30/2019    History of herpes labialis    Unspecified atrioventricular block     Partial AV block          Past Surgical History:       Past Surgical History:   Procedure Laterality Date    CHOLECYSTECTOMY  2008?    OTHER SURGICAL HISTORY  08/05/2019    Tonsillectomy    OTHER SURGICAL HISTORY  08/05/2019    Cholecystectomy    OTHER SURGICAL HISTORY  02/24/2022    Mastectomy    OTHER SURGICAL HISTORY  02/24/2022    Complete colonoscopy    OTHER SURGICAL HISTORY  02/24/2022    Pacemaker insertion    OTHER SURGICAL HISTORY  02/24/2022    Thoracotomy    OTHER SURGICAL HISTORY  08/27/2020     Colonoscopy         Medications:       Prior to Admission medications    Medication Sig Start Date End Date Taking? Authorizing Provider   alendronate (Fosamax) 70 mg tablet once per week in am, 8oz water, empty stomach, nothing by mouth and do not lie down for the next 30 minutes. 11/6/23   Historical Provider, MD   amino acids-protein supplement tablet Take by mouth once daily.    Historical Provider, MD   aspirin 81 mg EC tablet Take 1 tablet (81 mg) by mouth 2 times a week. 3/11/14   Historical Provider, MD   atenolol (Tenormin) 25 mg tablet Take 1 tablet (25 mg) by mouth once daily. 7/27/21   Historical Provider, MD   calcium carbonate (Oscal) 500 mg calcium (1,250 mg) tablet Take by mouth.    Historical Provider, MD   cholecalciferol, vitamin D3, 250 mcg (10,000 unit) tablet Take by mouth.    Historical Provider, MD   DOCOSAHEXAENOIC ACID ORAL Take 2 capsules by mouth once daily. 3/11/14   Historical Provider, MD   magnesium gluconate, bulk, powder Transdermal. 9/22/17   Historical Provider, MD   propranolol (Inderal) 20 mg tablet Take 1 tablet (20 mg) by mouth 2 times a day. 12/27/23 6/24/24  Lilliana Salinas,    pyridoxine (Vitamin B-6) 250 mg tablet Take by mouth.    Historical Provider, MD   rosuvastatin (Crestor) 10 mg tablet Take 1 tablet (10 mg) by mouth once daily. 7/27/21   Historical Provider, MD   scopolamine (Transderm-Scop) 1 mg over 3 days patch 3 day PLACE 1 PATCH EVERY 3 DAYS 9/25/19   Historical Provider, MD   valACYclovir (Valtrex) 500 mg tablet Take 1 tablet (500 mg) by mouth 2 times a day.    Historical Provider, MD   VITAMIN B COMPLEX ORAL Take 1 capsule by mouth once daily. 9/22/17   Historical Provider, MD [place]  Review of Systems   HENT:  Positive for hearing loss and trouble swallowing.    Cardiovascular:         Heart murmur   Gastrointestinal:  Positive for diarrhea.   Genitourinary:  Positive for enuresis, frequency and urgency.   Neurological:  Positive for tremors.           PHYSICAL EXAM:      There were no vitals taken for this visit.     No LMP recorded.      Declines chaperone for physical exam.    PVR=     Well developed, well nourished, in no apparent distress.   Neurologic/Psychiatric:  Awake, Alert and Oriented times 3.  Affect normal. Normal cranial nerves  Pulm: breathing without effort  Sexual maturity: Lm stage V  Abd exam: soft, non-tender      GENITAL/URINARY:       External Genitalia:  The patient has normal appearing external genitalia, normal skenes and bartholins glands, and a normal hair distribution.  Her vulva is without lesions, erythema or discharge.  It is non-tender with appropriate sensation.     Urethral Meatus:  Size normal, Location normal, Lesions absent, Prolapse absent,      Urethra:  Fullness absent, Masses absent,      Bladder:  Fullness absent, Masses absent, Tenderness absent,      Vagina:  General appearance normal, Estrogen effect normal, Discharge absent, Lesions absent,      Cervix: Normal, no discharge.   Uterus:  normal size and mobile  Adnexa:   bilateral normal    POP-Q  The patient has no Prolapse.    POP-Q: 0  Position: standing    Aa: -3       Ba:  C: -0   Gh:  Pb:  TVL: 10         Ap: -3 Bp:  D: -10             She does not have myofascial tenderness on exam.     She has moderate vaginal atrophy.         Lupis Adhikari MD

## 2024-05-02 NOTE — LETTER
May 5, 2024     Lilliana Salinas DO  [address]  [address]  [facility] [address]    Patient: Harmeet Conner   YOB: 1945   Date of Visit: 5/2/2024       Dear Dr. Lilliana Salinas DO:    Thank you for referring Harmeet Conner to me for evaluation. Below are my notes for this consultation.  If you have questions, please do not hesitate to call me. I look forward to following your patient along with you.       Sincerely,     Lupis Adhikari MD      CC: No Recipients  ______________________________________________________________________________________    Urogynecology  Provider:  Lupis Adhikari MD  [phone]              ASSESSMENT AND PLAN:   79 y/o new patient presenting with complaints of urinary urgency and frequency.    Diagnoses:  #1 OAB    Plan:  OAB  - Discussed etiology of OAB and potential management options.  - Reviewed bladder health recommendations (fluid intake, avoiding bladder triggers).  - Discussed OAB treatment options such as lifestyle changes, PFPT, medications, PTNS, intradetrusor Botox injections, or SNM.   - We discussed that PFPT may help with bladder/pelvic floor restrengthening exercises with an overall goal to better control the bladder and improve urinary symptoms. PFPT includes attending sessions with a specialized licensed female pelvic floor physical therapist who does external with internal vaginal work to ensure she is doing the correct exercises to obtain the most benefit of physical therapy. We reviewed the importance of continuing these home exercises to receive maximum benefit from PFPT. They also teach mind over bladder strategies/urge suppression techniques to reduce the intensity of the urge to void.   - We discussed that with starting an OAB medication the goal would be to allow the detrusor muscle around the bladder to relax and prevent the muscles from spasm/squeezing too frequently to allow the bladder to store more urine which reduces the  urge, frequency, and incontinent episodes.   - We briefly discussed third-line therapies we offer for OAB including PTNS which is an in-office weekly procedure x30 minutes of percutaneous tibial nerve stimulation procedure x12 weeks which is a time commitment, intradetrusor Botox injection in which we inject Botox to the muscle the controls the bladder to allow it to relax to provider OAB sx relief for up to 3-12 months but there is a possibility of urinary retention in which she would need to be amenable to learning how to CIC until the retention resolves over time as the effects of Botox wears off, and sacral neuromodulation where we do a PNE trial placing a temporary lead into the S3 sacral nerve that goes to the bowel/bladder in office and the patient evaluates after 5-7 days if they get 50% improvement in her urinary symptoms we would proceed with stage 2 of permanently placing the SNM device in the OR.   - Patient is amenable to pursuing medication.  - Sent Rx of Trospium 20mg to her preferred pharmacy with instructions to take 1 pill po BID. This medication will help improve bladder compliance by decreasing intensity of the bladder spasms thus improving urinary urgency. Discussed the drug profile being an anticholinergic and possible medication side effects including dry mouth, dry eyes, and constipation.    Return in 2 months to follow-up with Mary Welch NP at [place]. Then, 2 months from that, return to follow-up with Dr. Adhikari.    Scribe Attestation  By signing my name below, IIsac Scribe, attest that this documentation has been prepared under the direction and in the presence of Lupis Adhikari MD on 05/02/2024 at 10:36 AM.      Problem List Items Addressed This Visit    None          I spent a total of 45 minutes in face to face and non face to face time.      Lupis Adhikari MD              HISTORY OF PRESENT ILLNESS:   Sent in consultation by Dr. Salinas     Urinary  Symptoms:   - The patient regularly has nocturia multiple times nightly and occasional enuresis.  - She also complains of urinary urgency and frequency.  - She mentions that she used to consistently get bladder infections and another physician told her it was possible her  was not keeping himself as clean as he probably should be. We do not necessarily agree with this argument. She did, however, mention that she did not have UTIs for quite some time after the end of that marriage but now she presents with another infection.             Past Medical History:       Past Medical History:   Diagnosis Date   • Encounter for immunization 10/07/2020    Encounter for immunization   • Encounter for immunization 09/24/2021    Encounter for immunization   • Lesion of sciatic nerve, right lower limb     Piriformis syndrome of right side   • Other chest pain 03/18/2022    Atypical chest pain   • Other chest pain 10/01/2021    Chest discomfort   • Personal history of malignant neoplasm of breast     History of malignant neoplasm of breast   • Personal history of non-Hodgkin lymphomas     History of non-Hodgkin's lymphoma   • Personal history of other diseases of the circulatory system 03/18/2022    History of orthostatic hypotension   • Personal history of other diseases of the musculoskeletal system and connective tissue     History of low back pain   • Personal history of other diseases of the nervous system and sense organs     History of hearing loss   • Personal history of other diseases of the nervous system and sense organs     History of tinnitus   • Personal history of other endocrine, nutritional and metabolic disease     History of hyperlipidemia   • Personal history of other infectious and parasitic diseases 08/30/2019    History of herpes labialis   • Unspecified atrioventricular block     Partial AV block          Past Surgical History:       Past Surgical History:   Procedure Laterality Date   •  CHOLECYSTECTOMY  2008?   • OTHER SURGICAL HISTORY  08/05/2019    Tonsillectomy   • OTHER SURGICAL HISTORY  08/05/2019    Cholecystectomy   • OTHER SURGICAL HISTORY  02/24/2022    Mastectomy   • OTHER SURGICAL HISTORY  02/24/2022    Complete colonoscopy   • OTHER SURGICAL HISTORY  02/24/2022    Pacemaker insertion   • OTHER SURGICAL HISTORY  02/24/2022    Thoracotomy   • OTHER SURGICAL HISTORY  08/27/2020    Colonoscopy         Medications:       Prior to Admission medications    Medication Sig Start Date End Date Taking? Authorizing Provider   alendronate (Fosamax) 70 mg tablet once per week in am, 8oz water, empty stomach, nothing by mouth and do not lie down for the next 30 minutes. 11/6/23   Historical Provider, MD   amino acids-protein supplement tablet Take by mouth once daily.    Historical Provider, MD   aspirin 81 mg EC tablet Take 1 tablet (81 mg) by mouth 2 times a week. 3/11/14   Historical Provider, MD   atenolol (Tenormin) 25 mg tablet Take 1 tablet (25 mg) by mouth once daily. 7/27/21   Historical Provider, MD   calcium carbonate (Oscal) 500 mg calcium (1,250 mg) tablet Take by mouth.    Historical Provider, MD   cholecalciferol, vitamin D3, 250 mcg (10,000 unit) tablet Take by mouth.    Historical Provider, MD   DOCOSAHEXAENOIC ACID ORAL Take 2 capsules by mouth once daily. 3/11/14   Historical Provider, MD   magnesium gluconate, bulk, powder Transdermal. 9/22/17   Historical Provider, MD   propranolol (Inderal) 20 mg tablet Take 1 tablet (20 mg) by mouth 2 times a day. 12/27/23 6/24/24  Lilliana Salinas,    pyridoxine (Vitamin B-6) 250 mg tablet Take by mouth.    Historical Provider, MD   rosuvastatin (Crestor) 10 mg tablet Take 1 tablet (10 mg) by mouth once daily. 7/27/21   Historical Provider, MD   scopolamine (Transderm-Scop) 1 mg over 3 days patch 3 day PLACE 1 PATCH EVERY 3 DAYS 9/25/19   Historical Provider, MD   valACYclovir (Valtrex) 500 mg tablet Take 1 tablet (500 mg) by mouth 2 times a  day.    Historical Provider, MD   VITAMIN B COMPLEX ORAL Take 1 capsule by mouth once daily. 9/22/17   Historical Provider, MD [place]  Review of Systems   HENT:  Positive for hearing loss and trouble swallowing.    Cardiovascular:         Heart murmur   Gastrointestinal:  Positive for diarrhea.   Genitourinary:  Positive for enuresis, frequency and urgency.   Neurological:  Positive for tremors.          PHYSICAL EXAM:      There were no vitals taken for this visit.     No LMP recorded.      Declines chaperone for physical exam.    PVR=     Well developed, well nourished, in no apparent distress.   Neurologic/Psychiatric:  Awake, Alert and Oriented times 3.  Affect normal. Normal cranial nerves  Pulm: breathing without effort  Sexual maturity: Lm stage V  Abd exam: soft, non-tender      GENITAL/URINARY:       External Genitalia:  The patient has normal appearing external genitalia, normal skenes and bartholins glands, and a normal hair distribution.  Her vulva is without lesions, erythema or discharge.  It is non-tender with appropriate sensation.     Urethral Meatus:  Size normal, Location normal, Lesions absent, Prolapse absent,      Urethra:  Fullness absent, Masses absent,      Bladder:  Fullness absent, Masses absent, Tenderness absent,      Vagina:  General appearance normal, Estrogen effect normal, Discharge absent, Lesions absent,      Cervix: Normal, no discharge.   Uterus:  normal size and mobile  Adnexa:   bilateral normal    POP-Q  The patient has no Prolapse.    POP-Q: 0  Position: standing    Aa: -3       Ba:  C: -0   Gh:  Pb:  TVL: 10         Ap: -3 Bp:  D: -10             She does not have myofascial tenderness on exam.     She has moderate vaginal atrophy.         Lupis Adhikari MD

## 2024-05-03 ENCOUNTER — APPOINTMENT (OUTPATIENT)
Dept: HEMATOLOGY/ONCOLOGY | Facility: CLINIC | Age: 79
End: 2024-05-03
Payer: MEDICARE

## 2024-05-03 ASSESSMENT — ENCOUNTER SYMPTOMS: FREQUENCY: 1

## 2024-05-08 ENCOUNTER — TELEPHONE (OUTPATIENT)
Dept: HEMATOLOGY/ONCOLOGY | Facility: CLINIC | Age: 79
End: 2024-05-08
Payer: MEDICARE

## 2024-05-08 DIAGNOSIS — Z12.31 SCREENING MAMMOGRAM, ENCOUNTER FOR: ICD-10-CM

## 2024-05-08 DIAGNOSIS — Z17.0 MALIGNANT NEOPLASM OF LEFT BREAST IN FEMALE, ESTROGEN RECEPTOR POSITIVE, UNSPECIFIED SITE OF BREAST (MULTI): Primary | ICD-10-CM

## 2024-05-08 DIAGNOSIS — C50.912 MALIGNANT NEOPLASM OF LEFT BREAST IN FEMALE, ESTROGEN RECEPTOR POSITIVE, UNSPECIFIED SITE OF BREAST (MULTI): Primary | ICD-10-CM

## 2024-05-08 NOTE — TELEPHONE ENCOUNTER
Called to speak with patient however she did not answer so I left a voicemail letting her know that I did consult with Dr. Churchill who instructed that we will not continue breast MRIs but we will do yearly mammograms of her right breast and that there is no additional follow-up recommendations related to her history of radiation other than what we discussed in our visit.  Also informed her I would order a right axillary ultrasound in case radiology felt it was necessary after her mammogram.  Instructed on voicemail for patient to call back if she has questions or concerns and the nurse can review this information however I am intermittently available today if she would like to speak with me.  I left our office callback number.

## 2024-05-09 ENCOUNTER — TELEPHONE (OUTPATIENT)
Dept: OBSTETRICS AND GYNECOLOGY | Facility: CLINIC | Age: 79
End: 2024-05-09
Payer: MEDICARE

## 2024-05-09 NOTE — TELEPHONE ENCOUNTER
----- Message from Harmeet Conner sent at 5/6/2024  3:31 PM EDT -----  Regarding: Quick f/u question from appt 5/2  Contact: [phone]  Dr. Adhikari, I noticed that my urinalysis results mentioned blood in the urine, and your notes mentioned that I presented with a new UTI.  I wasn't aware that I had a UTI, and was not experiencing symptoms.  Is this something that requires treatment?    Thank you.    Harmeet Conner

## 2024-05-09 NOTE — TELEPHONE ENCOUNTER
Result Communication      10:01 AM      Results were successfully communicated with the patient and they acknowledged their understanding that she does not have a UTI. The in office urinalysis was negative.

## 2024-06-03 ENCOUNTER — OFFICE VISIT (OUTPATIENT)
Dept: CARDIOLOGY | Facility: CLINIC | Age: 79
End: 2024-06-03
Payer: MEDICARE

## 2024-06-03 VITALS
SYSTOLIC BLOOD PRESSURE: 129 MMHG | WEIGHT: 117.28 LBS | BODY MASS INDEX: 20.13 KG/M2 | RESPIRATION RATE: 15 BRPM | DIASTOLIC BLOOD PRESSURE: 64 MMHG | HEART RATE: 64 BPM | OXYGEN SATURATION: 99 % | TEMPERATURE: 97.2 F

## 2024-06-03 DIAGNOSIS — C50.912 MALIGNANT NEOPLASM OF LEFT BREAST IN FEMALE, ESTROGEN RECEPTOR POSITIVE, UNSPECIFIED SITE OF BREAST (MULTI): ICD-10-CM

## 2024-06-03 DIAGNOSIS — Z17.0 MALIGNANT NEOPLASM OF LEFT BREAST IN FEMALE, ESTROGEN RECEPTOR POSITIVE, UNSPECIFIED SITE OF BREAST (MULTI): ICD-10-CM

## 2024-06-03 DIAGNOSIS — I35.8 AORTIC VALVE SCLEROSIS: Primary | ICD-10-CM

## 2024-06-03 DIAGNOSIS — Z95.0 PACEMAKER: ICD-10-CM

## 2024-06-03 DIAGNOSIS — C85.90 NON-HODGKIN'S LYMPHOMA, UNSPECIFIED BODY REGION, UNSPECIFIED NON-HODGKIN LYMPHOMA TYPE (MULTI): ICD-10-CM

## 2024-06-03 DIAGNOSIS — R55 NEAR SYNCOPE: ICD-10-CM

## 2024-06-03 DIAGNOSIS — I10 BENIGN ESSENTIAL HTN: ICD-10-CM

## 2024-06-03 PROBLEM — Z86.16 PERSONAL HISTORY OF COVID-19: Status: RESOLVED | Noted: 2023-12-04 | Resolved: 2024-06-03

## 2024-06-03 PROCEDURE — 99214 OFFICE O/P EST MOD 30 MIN: CPT | Performed by: INTERNAL MEDICINE

## 2024-06-03 PROCEDURE — 3078F DIAST BP <80 MM HG: CPT | Performed by: INTERNAL MEDICINE

## 2024-06-03 PROCEDURE — 1036F TOBACCO NON-USER: CPT | Performed by: INTERNAL MEDICINE

## 2024-06-03 PROCEDURE — 1160F RVW MEDS BY RX/DR IN RCRD: CPT | Performed by: INTERNAL MEDICINE

## 2024-06-03 PROCEDURE — 3074F SYST BP LT 130 MM HG: CPT | Performed by: INTERNAL MEDICINE

## 2024-06-03 PROCEDURE — 1159F MED LIST DOCD IN RCRD: CPT | Performed by: INTERNAL MEDICINE

## 2024-06-03 PROCEDURE — 1126F AMNT PAIN NOTED NONE PRSNT: CPT | Performed by: INTERNAL MEDICINE

## 2024-06-03 ASSESSMENT — ENCOUNTER SYMPTOMS
DIFFICULTY URINATING: 1
CONSTITUTIONAL NEGATIVE: 1
ARTHRALGIAS: 1
GASTROINTESTINAL NEGATIVE: 1
PSYCHIATRIC NEGATIVE: 1
CARDIOVASCULAR NEGATIVE: 1
HEMATOLOGIC/LYMPHATIC NEGATIVE: 1

## 2024-06-03 ASSESSMENT — PAIN SCALES - GENERAL: PAINLEVEL: 0-NO PAIN

## 2024-06-03 NOTE — PROGRESS NOTES
Patient:  Harmeet Conner  YOB: 1945  MRN: [id number]       Chief Complaint/Active Symptoms:       Harmeet Conner is a 78 y.o. female who returns today for cardiac follow-up.    Patient comes today for cardiovascular follow-up from their survivors clinic.  Since I saw her last she has gotten established in the breast cancer survivor clinic their notes and recommendations were reviewed.  She is going to be followed every other year with breast MRI.    The patient is active and has no chest pain, shortness of breath, dizziness or lightheadedness.  She has no concerning palpitations.  She did discuss her episodes of near syncope with electrophysiology and they made a rate to drop adjustment in her pacemaker to avoid symptoms of what they thought was possibly neurally mediated syncope.  She says since he made that change in her pacemaker programming she has had no further episodes.  We talked her about some of the basic mechanisms behind that and things she can do to avoid inciting that those such as not standing still in 1 position for prolonged periods of time and maintaining hydration.    Cancer Diagnosis: Breast cancer - [facility] treatment left breast ER/HI/HER2 +, Acx4, taxol x 4 q3 weeks in May 2000, tamoxifen since 11/2000, [id number] on Femara    Cancer Diagnosis: Non-Hodgkins lymphoma - age 15 - chest radiation 3600 cg to left chest over 3 weeks.       Review of Systems   Constitutional: Negative.    Cardiovascular: Negative.    Gastrointestinal: Negative.    Genitourinary:  Positive for difficulty urinating.   Musculoskeletal:  Positive for arthralgias.   Hematological: Negative.    Psychiatric/Behavioral: Negative.     All other systems reviewed and are negative.      Objective:     Vitals:    06/03/24 1531   BP: 129/64   Pulse: 64   Resp: 15   Temp: 36.2 °C (97.2 °F)   SpO2: 99%       Vitals:    06/03/24 1531   Weight: 53.2 kg (117 lb 4.6 oz)       Allergies:     Allergies   Allergen Reactions     Iodinated Contrast Media Unknown    Iodine Shortness of breath and Nausea/vomiting     Body turns read    Benztropine Confusion    Gabapentin Confusion    Paroxetine Hcl Other     Dystonia    Prochlorperazine Other     dystonia          Medications:     Current Outpatient Medications   Medication Instructions    alendronate (Fosamax) 70 mg tablet once per week in am, 8oz water, empty stomach, nothing by mouth and do not lie down for the next 30 minutes.    amino acids-protein supplement tablet oral, Daily RT    aspirin 81 mg, oral, 2 times weekly    atenolol (TENORMIN) 25 mg, oral, Daily RT    calcium carbonate (Oscal) 500 mg calcium (1,250 mg) tablet oral    cholecalciferol, vitamin D3, 250 mcg (10,000 unit) tablet oral    DOCOSAHEXAENOIC ACID ORAL 2 capsules, oral, Daily RT    magnesium gluconate, bulk, powder Transdermal.    propranolol (INDERAL) 20 mg, oral, 2 times daily    pyridoxine (Vitamin B-6) 250 mg tablet oral    rosuvastatin (CRESTOR) 10 mg, oral, Daily RT    scopolamine (Transderm-Scop) 1 mg over 3 days patch 3 day PLACE 1 PATCH EVERY 3 DAYS    trospium (Sanctura) 20 mg tablet 1/2 to 1 tab twice daily for frequency and urgency    valACYclovir (Valtrex) 500 mg tablet 1 tablet, oral, 2 times daily    VITAMIN B COMPLEX ORAL 1 capsule, oral, Daily RT       Physical Examination:   GENERAL:  Well developed, well nourished, in no acute distress.  HEENT: NC AT, EOMI with anicteric sclera  NECK:  Supple, no JVD, no bruit.  CHEST:  Symmetric and nontender.  Pacemaker site without erythema or erosion  LUNGS:  Clear to auscultation bilaterally, normal respiratory effort.  HEART:  PMI is nondisplaced. RRR with normal S1 and S2, no S3, no mumur or rub. No carotid or abdominal bruits  ABDOMEN: Soft, NT, ND without palpable organomegaly or bruits  EXTREMITIES:  Warm with good color, no clubbing or cyanosis.  There is no edema noted.  PERIPHERAL VASCULAR:  Pulses present and equally palpable; 2+  "throughout.  MUSCULOSKELETAL:   NEURO/PSYCH:  Alert and oriented times three with approppriate behavior and responses. Nonfocal motor examination with normal gait and ambulation  Lymph: No significant palpable lymphadenopathy  Skin: no rash or lesions on exposed skin or reported.    Lab:     CBC:   Lab Results   Component Value Date    WBC 6.0 12/01/2023    RBC 3.94 (L) 12/01/2023    HGB 13.2 12/01/2023    HCT 40.6 12/01/2023     12/01/2023        CMP:    Lab Results   Component Value Date     12/01/2023    K 4.1 12/01/2023     12/01/2023    CO2 29 12/01/2023    BUN 15 12/01/2023    CREATININE 0.72 12/01/2023    GLUCOSE 89 12/01/2023    CALCIUM 9.4 12/01/2023       Magnesium:    No results found for: \"MG\"    Lipid Profile:    Lab Results   Component Value Date    TRIG 95 12/27/2022    HDL 66.2 12/27/2022       TSH:    Lab Results   Component Value Date    TSH 1.89 08/27/2020       BNP:   No results found for: \"BNP\"     PT/INR:    No results found for: \"PROTIME\", \"INR\"    HgBA1c:    No results found for: \"HGBA1C\"    BMP:  Lab Results   Component Value Date     12/01/2023     09/27/2021     09/09/2019    K 4.1 12/01/2023    K 4.0 09/27/2021    K 4.0 09/09/2019     12/01/2023     09/27/2021     09/09/2019    CO2 29 12/01/2023    CO2 26 09/27/2021    CO2 28 09/09/2019    BUN 15 12/01/2023    BUN 18 09/27/2021    BUN 18 09/09/2019    CREATININE 0.72 12/01/2023    CREATININE 0.91 09/27/2021    CREATININE 0.72 09/09/2019       Cardiac Enzymes:    No results found for: \"TROPHS\"    Hepatic Function Panel:    Lab Results   Component Value Date    ALKPHOS 56 12/01/2023    ALT 26 12/01/2023    AST 22 12/01/2023    PROT 6.6 12/01/2023    BILITOT 0.5 12/01/2023         Diagnostic Studies:     No recent testing, electrophysiology and device checks reviewed    Radiology:     No orders to display   Last imaging reviewed    ASSESSMENT     Problem List Items Addressed This Visit  "      Aortic valve sclerosis - Primary    Relevant Orders    Follow Up In Cardiology    Benign essential HTN    Malignant neoplasm of breast (Multi)    Near syncope    Non-Hodgkin's lymphoma (Multi)    Pacemaker       PLAN     1.  History of breast cancer and non-Hodgkin's lymphoma with left and central chest radiation.  No signs of any coronary calcifications on CT scan last year and a normal nuclear stress test.  At this point in time despite any chemotherapy as outlined above there is no signs of any cardiotoxicity.  At this point in time I have no recommendations for any regular echocardiographic monitoring for her prior cancer treatment.  2.  Aortic valve sclerosis.  Her echocardiogram actually had gradients consistent with mild aortic stenosis.  Her murmur today is more suggestive of aortic stenosis that is very mild.  We recommend that in the absence of any change in symptoms or murmur we repeat her echocardiogram 4 years after her last 1.  Reviewed the signs and symptoms of aortic stenosis.  We suggested she see cardiology on an annual basis for physical examination if she is following with electrophysiology and they have no concerns she could see us in 2 years.  3.  Pacemaker with history of AV block.  Near syncope.  Her story is suggestive of neurally mediated syncope and she has responded to change in her pacemaker program that is aimed towards addressing the same.  We have encouraged hydration and no prolonged standing in 1 position at this time.  Will defer any changes in management to EP.  4.  Benign essential hypertension.  Blood pressures are controlled on her current medical regimen.    As mentioned we will see her in follow-up in a years time where she continues to follow with electrophysiology in 2 years.  She has symptoms or change in examination she can be referred back for an earlier appointment.

## 2024-07-02 ENCOUNTER — HOSPITAL ENCOUNTER (OUTPATIENT)
Dept: RADIOLOGY | Facility: CLINIC | Age: 79
Discharge: HOME | End: 2024-07-02
Payer: MEDICARE

## 2024-07-02 VITALS — HEIGHT: 64 IN | BODY MASS INDEX: 20.02 KG/M2 | WEIGHT: 117.28 LBS

## 2024-07-02 DIAGNOSIS — C50.912 MALIGNANT NEOPLASM OF LEFT BREAST IN FEMALE, ESTROGEN RECEPTOR POSITIVE, UNSPECIFIED SITE OF BREAST (MULTI): ICD-10-CM

## 2024-07-02 DIAGNOSIS — Z12.31 ENCOUNTER FOR SCREENING MAMMOGRAM FOR MALIGNANT NEOPLASM OF BREAST: ICD-10-CM

## 2024-07-02 DIAGNOSIS — Z17.0 MALIGNANT NEOPLASM OF LEFT BREAST IN FEMALE, ESTROGEN RECEPTOR POSITIVE, UNSPECIFIED SITE OF BREAST (MULTI): ICD-10-CM

## 2024-07-02 DIAGNOSIS — N63.15 UNSPECIFIED LUMP IN THE RIGHT BREAST, OVERLAPPING QUADRANTS: ICD-10-CM

## 2024-07-02 DIAGNOSIS — N63.10 UNSPECIFIED LUMP IN THE RIGHT BREAST, UNSPECIFIED QUADRANT: ICD-10-CM

## 2024-07-02 DIAGNOSIS — Z12.31 SCREENING MAMMOGRAM, ENCOUNTER FOR: ICD-10-CM

## 2024-07-02 PROCEDURE — G0279 TOMOSYNTHESIS, MAMMO: HCPCS | Mod: RIGHT SIDE | Performed by: STUDENT IN AN ORGANIZED HEALTH CARE EDUCATION/TRAINING PROGRAM

## 2024-07-02 PROCEDURE — 77061 BREAST TOMOSYNTHESIS UNI: CPT | Mod: RT

## 2024-07-02 PROCEDURE — 76981 USE PARENCHYMA: CPT | Mod: RT

## 2024-07-02 PROCEDURE — 76642 ULTRASOUND BREAST LIMITED: CPT | Mod: RIGHT SIDE | Performed by: STUDENT IN AN ORGANIZED HEALTH CARE EDUCATION/TRAINING PROGRAM

## 2024-07-02 PROCEDURE — 76642 ULTRASOUND BREAST LIMITED: CPT | Mod: RT

## 2024-07-02 PROCEDURE — 77065 DX MAMMO INCL CAD UNI: CPT | Mod: RIGHT SIDE | Performed by: STUDENT IN AN ORGANIZED HEALTH CARE EDUCATION/TRAINING PROGRAM

## 2024-07-05 ENCOUNTER — TELEPHONE (OUTPATIENT)
Dept: HEMATOLOGY/ONCOLOGY | Facility: CLINIC | Age: 79
End: 2024-07-05
Payer: MEDICARE

## 2024-07-05 NOTE — TELEPHONE ENCOUNTER
Per Juancho Anderson CNP results of mammogram and breast ultrasound reviewed, both resulted negative.     Called patient and updated her of negative test results. Patient appreciative of call and verbalized understanding. Pt had no further questions.

## 2024-07-08 ENCOUNTER — APPOINTMENT (OUTPATIENT)
Dept: OBSTETRICS AND GYNECOLOGY | Facility: CLINIC | Age: 79
End: 2024-07-08
Payer: MEDICARE

## 2024-07-08 VITALS
HEIGHT: 64 IN | SYSTOLIC BLOOD PRESSURE: 110 MMHG | BODY MASS INDEX: 19.97 KG/M2 | DIASTOLIC BLOOD PRESSURE: 60 MMHG | WEIGHT: 117 LBS

## 2024-07-08 DIAGNOSIS — N32.81 OVERACTIVE BLADDER: ICD-10-CM

## 2024-07-08 LAB
POC APPEARANCE, URINE: CLEAR
POC BILIRUBIN, URINE: NEGATIVE
POC BLOOD, URINE: NEGATIVE
POC COLOR, URINE: YELLOW
POC GLUCOSE, URINE: NEGATIVE MG/DL
POC KETONES, URINE: NEGATIVE MG/DL
POC LEUKOCYTES, URINE: NEGATIVE
POC NITRITE,URINE: NEGATIVE
POC PH, URINE: 5.5 PH
POC PROTEIN, URINE: NEGATIVE MG/DL
POC SPECIFIC GRAVITY, URINE: 1.02
POC UROBILINOGEN, URINE: 0.2 EU/DL

## 2024-07-08 PROCEDURE — 1036F TOBACCO NON-USER: CPT | Performed by: NURSE PRACTITIONER

## 2024-07-08 PROCEDURE — 81003 URINALYSIS AUTO W/O SCOPE: CPT | Performed by: NURSE PRACTITIONER

## 2024-07-08 PROCEDURE — 1160F RVW MEDS BY RX/DR IN RCRD: CPT | Performed by: NURSE PRACTITIONER

## 2024-07-08 PROCEDURE — 1159F MED LIST DOCD IN RCRD: CPT | Performed by: NURSE PRACTITIONER

## 2024-07-08 PROCEDURE — 3074F SYST BP LT 130 MM HG: CPT | Performed by: NURSE PRACTITIONER

## 2024-07-08 PROCEDURE — 3078F DIAST BP <80 MM HG: CPT | Performed by: NURSE PRACTITIONER

## 2024-07-08 PROCEDURE — 99212 OFFICE O/P EST SF 10 MIN: CPT | Performed by: NURSE PRACTITIONER

## 2024-07-08 NOTE — PROGRESS NOTES
"HISTORY OF PRESENT ILLNESS:  Harmeet Conner is a 78 y.o. female, who presents in follow up for a medication check for Trospium 20 mg BID.        During last encounter on 5/2/24, reviewed and agreed to the following: OAB - Started on Trospium 20 mg BID with Dr. Adhikari.     Today she reports   Urinary Symptoms:  - She likes the Trospium and takes 10 mg BID instead of 20 mg BID.   - Patient feels she is emptying her bladder.   - She was previously most bothered by frequency especially at nighttime. There has been occasions where she woke up wet with urine, but this hasn't happened recently. These episodes were sporadic (every few years).   - Denies constipation as a side effect of Trospium.   - She did have trace blood in the urine when she saw Dr. Adhikari. 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           PHYSICAL EXAMINATION:  No LMP recorded. Patient is postmenopausal.  Body mass index is 20.08 kg/m².  /60   Ht 1.626 m (5' 4\")   Wt 53.1 kg (117 lb)   BMI 20.08 kg/m²   General Appearance: well appearing  Neuro: Alert and oriented       IMPRESSION AND PLAN:  Harmeet Conner is a 78 y.o. who is being treated for OAB.     Plan    1. OAB  - She is satisfied with her improvement on Trospium and will continue at this time. Patient is taking Trospium 10 mg BID instead of 20 mg BID, which is managing her OAB symptoms.     2. Trace blood at prior visit on 5/2/24   - UA negative for blood today. No need for hematuria workup.   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       Follow up in 6 months with DEJON Blackburn and then yearly.     Scribe Attestation:   Ann-Marie MCKEON, am scribing for virtually, and in the presence of DEJON Blackburn on 7/8/24 at 12:50 PM.     Mary MCKEON" Rebekah, personally performed the services described in the documentation as scribed in my presence and confirm it is both complete and accurate.

## 2024-07-09 ENCOUNTER — HOSPITAL ENCOUNTER (OUTPATIENT)
Dept: CARDIOLOGY | Facility: HOSPITAL | Age: 79
Discharge: HOME | End: 2024-07-09
Payer: MEDICARE

## 2024-07-09 DIAGNOSIS — Z95.0 CARDIAC PACEMAKER IN SITU: ICD-10-CM

## 2024-07-09 DIAGNOSIS — I44.2 ATRIOVENTRICULAR BLOCK, COMPLETE (MULTI): ICD-10-CM

## 2024-07-09 PROCEDURE — 93296 REM INTERROG EVL PM/IDS: CPT

## 2024-07-15 DIAGNOSIS — R25.1 TREMOR: ICD-10-CM

## 2024-07-15 RX ORDER — PROPRANOLOL HYDROCHLORIDE 20 MG/1
20 TABLET ORAL 2 TIMES DAILY
Qty: 60 TABLET | Refills: 5 | Status: SHIPPED | OUTPATIENT
Start: 2024-07-15

## 2024-07-25 ENCOUNTER — OFFICE VISIT (OUTPATIENT)
Dept: PRIMARY CARE | Facility: CLINIC | Age: 79
End: 2024-07-25
Payer: MEDICARE

## 2024-07-25 VITALS
DIASTOLIC BLOOD PRESSURE: 64 MMHG | OXYGEN SATURATION: 99 % | HEIGHT: 64 IN | RESPIRATION RATE: 16 BRPM | TEMPERATURE: 97.8 F | SYSTOLIC BLOOD PRESSURE: 124 MMHG | WEIGHT: 119 LBS | BODY MASS INDEX: 20.32 KG/M2 | HEART RATE: 68 BPM

## 2024-07-25 DIAGNOSIS — G62.9 PERIPHERAL POLYNEUROPATHY: Primary | ICD-10-CM

## 2024-07-25 PROCEDURE — 1124F ACP DISCUSS-NO DSCNMKR DOCD: CPT | Performed by: FAMILY MEDICINE

## 2024-07-25 PROCEDURE — 3074F SYST BP LT 130 MM HG: CPT | Performed by: FAMILY MEDICINE

## 2024-07-25 PROCEDURE — 3078F DIAST BP <80 MM HG: CPT | Performed by: FAMILY MEDICINE

## 2024-07-25 PROCEDURE — 99214 OFFICE O/P EST MOD 30 MIN: CPT | Performed by: FAMILY MEDICINE

## 2024-07-25 PROCEDURE — 1159F MED LIST DOCD IN RCRD: CPT | Performed by: FAMILY MEDICINE

## 2024-07-25 RX ORDER — NORTRIPTYLINE HYDROCHLORIDE 25 MG/1
25 CAPSULE ORAL NIGHTLY
Qty: 30 CAPSULE | Refills: 5 | Status: SHIPPED | OUTPATIENT
Start: 2024-07-25 | End: 2025-01-21

## 2024-07-25 ASSESSMENT — ENCOUNTER SYMPTOMS
NUMBNESS: 1
PSYCHIATRIC NEGATIVE: 1
EYES NEGATIVE: 1
GASTROINTESTINAL NEGATIVE: 1
RESPIRATORY NEGATIVE: 1
CONSTITUTIONAL NEGATIVE: 1
CARDIOVASCULAR NEGATIVE: 1
ENDOCRINE NEGATIVE: 1

## 2024-07-25 ASSESSMENT — PATIENT HEALTH QUESTIONNAIRE - PHQ9
2. FEELING DOWN, DEPRESSED OR HOPELESS: NOT AT ALL
1. LITTLE INTEREST OR PLEASURE IN DOING THINGS: NOT AT ALL
SUM OF ALL RESPONSES TO PHQ9 QUESTIONS 1 AND 2: 0

## 2024-07-25 NOTE — PROGRESS NOTES
"Subjective     Patient ID: Harmeet Conner is a 78 y.o. female who presents for Peripheral Neuropathy (Recurrent feeling of sensation of tightness  and numbness in feet and ankles increased in the last 3 weeks.).  HPI Had a severe infection in her upper chest wall after a MOHS surgery.   Was placed on dicloxacillin and the symptoms occurred after this treatment.    She has a history of breast cancer. She was treated with chemotherapy that gave her a neuropathy. The tingling went away for the most part. She was left with an unusual sensation in the distribution of ankle socks. She has adjusted to this situation. Now she has a new neuropathy on top which is disruptive and annoying.     Review of Systems   Constitutional: Negative.    HENT: Negative.     Eyes: Negative.    Respiratory: Negative.     Cardiovascular: Negative.    Gastrointestinal: Negative.    Endocrine: Negative.    Genitourinary: Negative.    Skin: Negative.    Neurological:  Positive for numbness.   Psychiatric/Behavioral: Negative.         Objective     Vitals:    07/25/24 1206   BP: 124/64   BP Location: Left arm   Patient Position: Sitting   Pulse: 68   Resp: 16   Temp: 36.6 °C (97.8 °F)   SpO2: 99%   Weight: 54 kg (119 lb)   Height: 1.626 m (5' 4\")        Current Outpatient Medications   Medication Instructions    alendronate (Fosamax) 70 mg tablet once per week in am, 8oz water, empty stomach, nothing by mouth and do not lie down for the next 30 minutes.    amino acids-protein supplement tablet oral, Daily RT    aspirin 81 mg, oral, 2 times weekly    atenolol (TENORMIN) 25 mg, oral, Daily RT    calcium carbonate (Oscal) 500 mg calcium (1,250 mg) tablet oral    cholecalciferol, vitamin D3, 250 mcg (10,000 unit) tablet oral    DOCOSAHEXAENOIC ACID ORAL 2 capsules, oral, Daily RT    magnesium gluconate, bulk, powder Transdermal.    propranolol (INDERAL) 20 mg, oral, 2 times daily    pyridoxine (Vitamin B-6) 250 mg tablet oral    rosuvastatin (CRESTOR) " 10 mg, oral, Daily RT    scopolamine (Transderm-Scop) 1 mg over 3 days patch 3 day PLACE 1 PATCH EVERY 3 DAYS    trospium (Sanctura) 20 mg tablet 1/2 to 1 tab twice daily for frequency and urgency    valACYclovir (Valtrex) 500 mg tablet 1 tablet, oral, 2 times daily    VITAMIN B COMPLEX ORAL 1 capsule, oral, Daily RT        Physical Exam  Constitutional:       Appearance: Normal appearance.   HENT:      Head: Normocephalic and atraumatic.   Cardiovascular:      Rate and Rhythm: Normal rate and regular rhythm.   Pulmonary:      Effort: Pulmonary effort is normal.      Breath sounds: Normal breath sounds.   Skin:     General: Skin is warm and dry.   Neurological:      Mental Status: She is alert.      Comments: Decreased sensation on feet.          Assessment/Plan   Diagnoses and all orders for this visit:  Peripheral polyneuropathy  -     nortriptyline (Pamelor) 25 mg capsule; Take 1 capsule (25 mg) by mouth once daily at bedtime.           If it does not work in a week she may discontinue.   If the symptoms persist through labor day, will get an EMG

## 2024-08-19 ENCOUNTER — APPOINTMENT (OUTPATIENT)
Dept: PRIMARY CARE | Facility: CLINIC | Age: 79
End: 2024-08-19
Payer: MEDICARE

## 2024-08-19 VITALS
WEIGHT: 116.4 LBS | BODY MASS INDEX: 19.87 KG/M2 | RESPIRATION RATE: 16 BRPM | SYSTOLIC BLOOD PRESSURE: 124 MMHG | DIASTOLIC BLOOD PRESSURE: 78 MMHG | OXYGEN SATURATION: 98 % | HEART RATE: 97 BPM | TEMPERATURE: 97.5 F | HEIGHT: 64 IN

## 2024-08-19 DIAGNOSIS — R25.1 TREMOR: ICD-10-CM

## 2024-08-19 DIAGNOSIS — R13.12 OROPHARYNGEAL DYSPHAGIA: ICD-10-CM

## 2024-08-19 DIAGNOSIS — G62.9 PERIPHERAL POLYNEUROPATHY: Primary | ICD-10-CM

## 2024-08-19 PROCEDURE — 3074F SYST BP LT 130 MM HG: CPT | Performed by: FAMILY MEDICINE

## 2024-08-19 PROCEDURE — 99214 OFFICE O/P EST MOD 30 MIN: CPT | Performed by: FAMILY MEDICINE

## 2024-08-19 PROCEDURE — 1159F MED LIST DOCD IN RCRD: CPT | Performed by: FAMILY MEDICINE

## 2024-08-19 PROCEDURE — 3078F DIAST BP <80 MM HG: CPT | Performed by: FAMILY MEDICINE

## 2024-08-19 RX ORDER — ESOMEPRAZOLE MAGNESIUM 40 MG/1
40 CAPSULE, DELAYED RELEASE ORAL
Qty: 30 CAPSULE | Refills: 5 | Status: SHIPPED | OUTPATIENT
Start: 2024-08-19 | End: 2025-02-15

## 2024-08-19 RX ORDER — PROPRANOLOL HYDROCHLORIDE 40 MG/1
40 TABLET ORAL 2 TIMES DAILY
Qty: 60 TABLET | Refills: 3 | Status: SHIPPED | OUTPATIENT
Start: 2024-08-19

## 2024-08-19 NOTE — PROGRESS NOTES
"Subjective     Patient ID: Harmeet Conner is a 78 y.o. female who presents for Tremors.  HPI The peripheral neuropathy has NOT improved on Pamelor. Has some discoloration of feet as well. Has had tremors in her hands for years, but seem to all of a sudden getting worse. It is now an intention tremor. Noticing a slight decline in memory gaps.   She was seen by me in the recent past for dysphagia. She saw Gastro and they did an EGD. She had a modified barium swallow as well. Both were normal, but she was asymptomatic at the time.  She feels like pills, etc are getting stuck in the lower esophagus and not getting in to her stomach.      Review of Systems   Constitutional: Negative.    HENT: Negative.     Eyes: Negative.    Respiratory: Negative.     Cardiovascular: Negative.    Gastrointestinal:         Dysphagia   Endocrine: Negative.    Genitourinary: Negative.    Skin: Negative.    Neurological:  Positive for tremors and numbness.        Memory problems   Psychiatric/Behavioral: Negative.         Objective     Vitals:    08/19/24 1547   BP: 124/78   BP Location: Right arm   Patient Position: Sitting   Pulse: 97   Resp: 16   Temp: 36.4 °C (97.5 °F)   SpO2: 98%   Weight: 52.8 kg (116 lb 6.4 oz)   Height: 1.626 m (5' 4\")        Current Outpatient Medications   Medication Instructions    alendronate (Fosamax) 70 mg tablet once per week in am, 8oz water, empty stomach, nothing by mouth and do not lie down for the next 30 minutes.    amino acids-protein supplement tablet oral, Daily RT    aspirin 81 mg, oral, 2 times weekly    calcium carbonate (Oscal) 500 mg calcium (1,250 mg) tablet oral    cholecalciferol, vitamin D3, 250 mcg (10,000 unit) tablet oral    DOCOSAHEXAENOIC ACID ORAL 2 capsules, oral, Daily RT    esomeprazole (NEXIUM) 40 mg, oral, Daily before breakfast, Do not open capsule.    magnesium gluconate, bulk, powder Transdermal.    nortriptyline (PAMELOR) 25 mg, oral, Nightly    propranolol (INDERAL) 40 mg, oral, " 2 times daily    pyridoxine (Vitamin B-6) 250 mg tablet oral    rosuvastatin (CRESTOR) 10 mg, oral, Daily RT    scopolamine (Transderm-Scop) 1 mg over 3 days patch 3 day PLACE 1 PATCH EVERY 3 DAYS    trospium (Sanctura) 20 mg tablet 1/2 to 1 tab twice daily for frequency and urgency    valACYclovir (Valtrex) 500 mg tablet 1 tablet, oral, 2 times daily    VITAMIN B COMPLEX ORAL 1 capsule, oral, Daily RT        Physical Exam  Constitutional:       Appearance: Normal appearance.   HENT:      Head: Normocephalic and atraumatic.      Right Ear: Tympanic membrane normal.      Left Ear: Tympanic membrane normal.      Nose: Nose normal.      Mouth/Throat:      Mouth: Mucous membranes are moist.   Cardiovascular:      Rate and Rhythm: Normal rate and regular rhythm.   Pulmonary:      Effort: Pulmonary effort is normal.      Breath sounds: Normal breath sounds.   Musculoskeletal:         General: Normal range of motion.   Neurological:      General: No focal deficit present.      Mental Status: She is alert.         Assessment/Plan   Diagnoses and all orders for this visit:  Peripheral polyneuropathy  Tremor  -     propranolol (Inderal) 40 mg tablet; Take 1 tablet (40 mg) by mouth 2 times a day.  -     Referral to Neurology; Future  Oropharyngeal dysphagia  -     esomeprazole (NexIUM) 40 mg DR capsule; Take 1 capsule (40 mg) by mouth once daily in the morning. Take before meals. Do not open capsule.  -     Referral to Gastroenterology; Future

## 2024-08-20 ASSESSMENT — ENCOUNTER SYMPTOMS
TREMORS: 1
EYES NEGATIVE: 1
CONSTITUTIONAL NEGATIVE: 1
ROS GI COMMENTS: DYSPHAGIA
NUMBNESS: 1
PSYCHIATRIC NEGATIVE: 1
CARDIOVASCULAR NEGATIVE: 1
RESPIRATORY NEGATIVE: 1
ENDOCRINE NEGATIVE: 1

## 2024-09-03 DIAGNOSIS — G62.9 PERIPHERAL POLYNEUROPATHY: ICD-10-CM

## 2024-09-03 RX ORDER — NORTRIPTYLINE HYDROCHLORIDE 10 MG/1
CAPSULE ORAL
Qty: 60 CAPSULE | Refills: 5 | Status: SHIPPED | OUTPATIENT
Start: 2024-09-03

## 2024-09-05 ENCOUNTER — APPOINTMENT (OUTPATIENT)
Dept: OBSTETRICS AND GYNECOLOGY | Facility: CLINIC | Age: 79
End: 2024-09-05
Payer: MEDICARE

## 2024-09-24 ENCOUNTER — APPOINTMENT (OUTPATIENT)
Dept: CARDIOLOGY | Facility: CLINIC | Age: 79
End: 2024-09-24
Payer: MEDICARE

## 2024-09-24 VITALS
WEIGHT: 118.4 LBS | SYSTOLIC BLOOD PRESSURE: 118 MMHG | DIASTOLIC BLOOD PRESSURE: 62 MMHG | TEMPERATURE: 97.6 F | HEIGHT: 64 IN | HEART RATE: 61 BPM | BODY MASS INDEX: 20.22 KG/M2

## 2024-09-24 DIAGNOSIS — Z17.0 MALIGNANT NEOPLASM OF LEFT BREAST IN FEMALE, ESTROGEN RECEPTOR POSITIVE, UNSPECIFIED SITE OF BREAST: ICD-10-CM

## 2024-09-24 DIAGNOSIS — C50.912 MALIGNANT NEOPLASM OF LEFT BREAST IN FEMALE, ESTROGEN RECEPTOR POSITIVE, UNSPECIFIED SITE OF BREAST: ICD-10-CM

## 2024-09-24 DIAGNOSIS — I44.2 ATRIOVENTRICULAR BLOCK, COMPLETE (MULTI): ICD-10-CM

## 2024-09-24 DIAGNOSIS — Z95.0 CARDIAC PACEMAKER IN SITU: ICD-10-CM

## 2024-09-24 PROCEDURE — 1036F TOBACCO NON-USER: CPT | Performed by: INTERNAL MEDICINE

## 2024-09-24 PROCEDURE — 3074F SYST BP LT 130 MM HG: CPT | Performed by: INTERNAL MEDICINE

## 2024-09-24 PROCEDURE — 93280 PM DEVICE PROGR EVAL DUAL: CPT | Performed by: INTERNAL MEDICINE

## 2024-09-24 PROCEDURE — 99213 OFFICE O/P EST LOW 20 MIN: CPT | Performed by: INTERNAL MEDICINE

## 2024-09-24 PROCEDURE — 1159F MED LIST DOCD IN RCRD: CPT | Performed by: INTERNAL MEDICINE

## 2024-09-24 PROCEDURE — 3078F DIAST BP <80 MM HG: CPT | Performed by: INTERNAL MEDICINE

## 2024-09-24 NOTE — PROGRESS NOTES
"  Subjective:  The patient is a 78-year-old white female, followed primarily by Dr. Lilliana Salinas, who presents today for pacemaker follow-up.  She has a history of remote non-Hodgkin's lymphoma as a teenager, treated with chemotherapy and radiation.  She also had left breast cancer as an adult with partial mastectomy.  She has a history of a complete left bundle branch block that progressed to complete heart block by October 2018.  I implanted a Medtronic DDDR pacemaker at the request of [facility] electrophysiologist Dr. Benigno Pederson.  The patient has done well since then, except for occasional classic neurally-mediated syncope, typically in the setting of dehydration.  Last year, we programmed her \"rate drop response\" feature on, and she has been totally free of syncope since then.    The patient has a bachelor's degree from "[employer] and later earned a PhD in sociology, and taught at several colleges and universities.  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  She is apparently  and lives independently.  She exercises on a stationary bicycle for 20 minutes each day, down from her prior 30 minutes, since the exercise is rather boring.    Current Outpatient Medications   Medication Sig    alendronate (Fosamax) 70 mg tablet once per week in am, 8oz water, empty stomach, nothing by mouth and do not lie down for the next 30 minutes.    amino acids-protein supplement tablet Take by mouth once daily.    aspirin 81 mg EC tablet Take 1 tablet (81 mg) by mouth 2 times a week.    calcium carbonate (Oscal) 500 mg calcium (1,250 mg) tablet Take by mouth.    cholecalciferol, vitamin D3, 250 mcg (10,000 unit) tablet Take by mouth.    DOCOSAHEXAENOIC ACID ORAL Take 2 capsules by mouth once daily.    esomeprazole (NexIUM) 40 mg DR capsule Take 1 capsule (40 mg) by mouth once daily in the morning. Take before meals. Do not open capsule. "    magnesium gluconate, bulk, powder Transdermal.    nortriptyline (Pamelor) 10 mg capsule Take one to two capsules at bedtime    propranolol (Inderal) 40 mg tablet Take 1 tablet (40 mg) by mouth 2 times a day.    pyridoxine (Vitamin B-6) 250 mg tablet Take by mouth.    rosuvastatin (Crestor) 10 mg tablet Take 1 tablet (10 mg) by mouth once daily.    scopolamine (Transderm-Scop) 1 mg over 3 days patch 3 day PLACE 1 PATCH EVERY 3 DAYS    trospium (Sanctura) 20 mg tablet 1/2 to 1 tab twice daily for frequency and urgency    valACYclovir (Valtrex) 500 mg tablet Take 1 tablet (500 mg) by mouth 2 times a day.    VITAMIN B COMPLEX ORAL Take 1 capsule by mouth once daily.     Allergies:  Iodinated contrast media, Iodine, Benztropine, Gabapentin, Paroxetine hcl, and Prochlorperazine     Patient Active Problem List   Diagnosis    Abnormal thyroid scan    Age-related osteoporosis without current pathological fracture    Aortic valve sclerosis    Autonomic neuropathy    Benign essential HTN    Bilateral hip pain    Cognitive complaints    Cognitive change    Cough    Diarrhea    Dysphagia    Essential tremor    Hearing loss    Heartburn    LBBB (left bundle branch block)    Lightheadedness    Lymphoma, nodular, thorax (Multi)    Malignant neoplasm of breast (Multi)    Near syncope    Non-Hodgkin's lymphoma (Multi)    Osteoporosis    Pacemaker    Complete atrioventricular block (Multi)    Partial AV block    Peripheral polyneuropathy    Post-COVID chronic cough    Postural dizziness    Sinus pause    Syncope    Goiter    Thyroid nodule    Troponin level elevated    Wide-complex tachycardia     Past Surgical History:   Procedure Laterality Date    CHOLECYSTECTOMY  2008?    OTHER SURGICAL HISTORY  08/05/2019    Tonsillectomy    OTHER SURGICAL HISTORY  08/05/2019    Cholecystectomy    OTHER SURGICAL HISTORY Left 01/01/2000 2000 left breast mastectomy    OTHER SURGICAL HISTORY  02/24/2022    Complete colonoscopy    OTHER  "SURGICAL HISTORY  02/24/2022    Pacemaker insertion    OTHER SURGICAL HISTORY  02/24/2022    Thoracotomy    OTHER SURGICAL HISTORY  08/27/2020    Colonoscopy     Objective:  Vitals:    09/24/24 1421   BP: 118/62   Pulse: 61   Temp: 36.4 °C (97.6 °F)   Height:     1.626 m (5' 4\")  Weight: 53.7 kg (118 lb 6.4 oz)     Exam:  Gen: Thin woman in no distress; articulate, alert, and oriented.  HEENT: No scleral icterus; wearing glasses.  Neck: No jugular venous distention or thyromegaly.  Right subclavian pacemaker pocket: Normal to palpation.  Lungs: Clear to auscultation, with no wheezes or rales.  Heart: Regular rhythm with harsh grade 2/6 systolic ejection murmur and preserved S2.  Abdomen: Benign, with no organomegaly or masses.  Extremities: Intact distal pulses; no edema.  Neuro: No focal neurologic abnormalities.  Skin: No cutaneous lesions.    Device Check:  A Medtronic pacemaker check was done today.  The unit is programmed DDD from 60 bpm to 130 bpm, which provides over 17% atrial pacing and nearly 100% ventricular pacing.  The lead parameters were excellent and the battery longevity estimated at 6.3 years.  There was no burden of atrial fibrillation.    Impressions:  1.  Remote malignancies including non-Hodgkin's lymphoma and left breast cancer with old left mastectomy in 2000.  2.  Conduction system disease, perhaps secondary to remote chest radiation.  Her left bundle branch block progressed to complete heart block and she is now pacemaker-dependent.  3.  Status post Medtronic DDDR pacemaker in October 2018, with normal device function.  4.  Episodic neurally-mediated near-syncope, no with \"rate drop response\" programmed into her pacing system with some improvement in this problem.  5.  Aortic sclerosis without stenosis.    Recommendations:  1.  The patient's device will be checked remotely every 3 months.  2.  She will follow-up with electrophysiology on an annual basis.  3.  With any future left " ventricular dysfunction thought to be due to right ventricular pacing, she could conceivably be upgraded to a biventricular system.      Sunny Dumont MD

## 2024-10-10 ENCOUNTER — OFFICE VISIT (OUTPATIENT)
Dept: NEUROLOGY | Facility: CLINIC | Age: 79
End: 2024-10-10
Payer: MEDICARE

## 2024-10-10 VITALS
HEART RATE: 82 BPM | BODY MASS INDEX: 19.97 KG/M2 | DIASTOLIC BLOOD PRESSURE: 65 MMHG | SYSTOLIC BLOOD PRESSURE: 115 MMHG | HEIGHT: 64 IN | WEIGHT: 117 LBS

## 2024-10-10 DIAGNOSIS — Z13.89 SCREENING FOR NEUROLOGICAL CONDITION: ICD-10-CM

## 2024-10-10 DIAGNOSIS — G62.9 PERIPHERAL POLYNEUROPATHY: Primary | ICD-10-CM

## 2024-10-10 DIAGNOSIS — Z13.9 SCREENING FOR CONDITION: ICD-10-CM

## 2024-10-10 PROCEDURE — 3074F SYST BP LT 130 MM HG: CPT | Performed by: NURSE PRACTITIONER

## 2024-10-10 PROCEDURE — 1159F MED LIST DOCD IN RCRD: CPT | Performed by: NURSE PRACTITIONER

## 2024-10-10 PROCEDURE — 99214 OFFICE O/P EST MOD 30 MIN: CPT | Performed by: NURSE PRACTITIONER

## 2024-10-10 PROCEDURE — 3078F DIAST BP <80 MM HG: CPT | Performed by: NURSE PRACTITIONER

## 2024-10-10 PROCEDURE — 99204 OFFICE O/P NEW MOD 45 MIN: CPT | Performed by: NURSE PRACTITIONER

## 2024-10-10 PROCEDURE — 1036F TOBACCO NON-USER: CPT | Performed by: NURSE PRACTITIONER

## 2024-10-10 ASSESSMENT — ANXIETY QUESTIONNAIRES
2. NOT BEING ABLE TO STOP OR CONTROL WORRYING: NOT AT ALL
IF YOU CHECKED OFF ANY PROBLEMS ON THIS QUESTIONNAIRE, HOW DIFFICULT HAVE THESE PROBLEMS MADE IT FOR YOU TO DO YOUR WORK, TAKE CARE OF THINGS AT HOME, OR GET ALONG WITH OTHER PEOPLE: NOT DIFFICULT AT ALL
GAD7 TOTAL SCORE: 0
1. FEELING NERVOUS, ANXIOUS, OR ON EDGE: NOT AT ALL
6. BECOMING EASILY ANNOYED OR IRRITABLE: NOT AT ALL
3. WORRYING TOO MUCH ABOUT DIFFERENT THINGS: NOT AT ALL
5. BEING SO RESTLESS THAT IT IS HARD TO SIT STILL: NOT AT ALL
4. TROUBLE RELAXING: NOT AT ALL
7. FEELING AFRAID AS IF SOMETHING AWFUL MIGHT HAPPEN: NOT AT ALL

## 2024-10-10 ASSESSMENT — ENCOUNTER SYMPTOMS
LOSS OF SENSATION IN FEET: 0
OCCASIONAL FEELINGS OF UNSTEADINESS: 0
DEPRESSION: 0

## 2024-10-10 NOTE — PROGRESS NOTES
Patient being assessed today for initial evaluation of peripheral neuropathy.  She had experienced peripheral neuropathy after chemo that she received from breast cancer about 25 years ago.  In June she had a sudden onset of feeling like she has a very tight sock on both of her feet.  Her left foot actually even underwent some color changes in addition to the tight feeling.  It goes up to about the height of the tube sock.  The sensation is constant.  It does not affect her ability to ambulate nor her balance.  Intact to sensation of pinprick and cotton as well as hot and cold water.  With the sudden onset of this would like to get some labs to rule out possible reversible causes to include folate, B12, CBC, CMP.  Depending on those results may get a imaging completed as well.  Follow-up after labs completed.    This note was created with voice recognition software and was not corrected for typographical or grammatical errors

## 2024-10-18 DIAGNOSIS — N32.81 OVERACTIVE BLADDER: ICD-10-CM

## 2024-10-18 RX ORDER — TROSPIUM CHLORIDE 20 MG/1
TABLET, FILM COATED ORAL
Qty: 60 TABLET | Refills: 3 | Status: SHIPPED | OUTPATIENT
Start: 2024-10-18

## 2024-10-18 NOTE — TELEPHONE ENCOUNTER
Pharm[facility] called [facility] Patient last visit was 7/08/2024

## 2024-10-23 ENCOUNTER — LAB (OUTPATIENT)
Dept: LAB | Facility: LAB | Age: 79
End: 2024-10-23
Payer: MEDICARE

## 2024-10-23 DIAGNOSIS — M81.0 AGE-RELATED OSTEOPOROSIS WITHOUT CURRENT PATHOLOGICAL FRACTURE: ICD-10-CM

## 2024-10-23 DIAGNOSIS — Z13.89 SCREENING FOR NEUROLOGICAL CONDITION: ICD-10-CM

## 2024-10-23 DIAGNOSIS — E78.5 HYPERLIPIDEMIA, UNSPECIFIED: Primary | ICD-10-CM

## 2024-10-23 DIAGNOSIS — G62.9 PERIPHERAL POLYNEUROPATHY: ICD-10-CM

## 2024-10-23 DIAGNOSIS — Z13.9 SCREENING FOR CONDITION: ICD-10-CM

## 2024-10-23 DIAGNOSIS — R73.03 PREDIABETES: ICD-10-CM

## 2024-10-23 LAB
25(OH)D3 SERPL-MCNC: 73 NG/ML (ref 30–100)
ALBUMIN SERPL BCP-MCNC: 3.7 G/DL (ref 3.4–5)
ALP SERPL-CCNC: 41 U/L (ref 33–136)
ALT SERPL W P-5'-P-CCNC: 15 U/L (ref 7–45)
ANION GAP SERPL CALC-SCNC: 11 MMOL/L (ref 10–20)
AST SERPL W P-5'-P-CCNC: 19 U/L (ref 9–39)
BASOPHILS # BLD AUTO: 0.04 X10*3/UL (ref 0–0.1)
BASOPHILS NFR BLD AUTO: 0.7 %
BILIRUB SERPL-MCNC: 0.6 MG/DL (ref 0–1.2)
BUN SERPL-MCNC: 23 MG/DL (ref 6–23)
CALCIUM SERPL-MCNC: 9.1 MG/DL (ref 8.6–10.6)
CHLORIDE SERPL-SCNC: 104 MMOL/L (ref 98–107)
CO2 SERPL-SCNC: 32 MMOL/L (ref 21–32)
CREAT SERPL-MCNC: 0.76 MG/DL (ref 0.5–1.05)
CRP SERPL-MCNC: 0.45 MG/DL
EGFRCR SERPLBLD CKD-EPI 2021: 79 ML/MIN/1.73M*2
EOSINOPHIL # BLD AUTO: 0.14 X10*3/UL (ref 0–0.4)
EOSINOPHIL NFR BLD AUTO: 2.6 %
ERYTHROCYTE [DISTWIDTH] IN BLOOD BY AUTOMATED COUNT: 13.4 % (ref 11.5–14.5)
EST. AVERAGE GLUCOSE BLD GHB EST-MCNC: 111 MG/DL
FOLATE SERPL-MCNC: 23.3 NG/ML
GLUCOSE SERPL-MCNC: 91 MG/DL (ref 74–99)
HBA1C MFR BLD: 5.5 %
HCT VFR BLD AUTO: 40.4 % (ref 36–46)
HCYS SERPL-SCNC: 9.08 UMOL/L (ref 5–13.9)
HGB BLD-MCNC: 13.2 G/DL (ref 12–16)
IMM GRANULOCYTES # BLD AUTO: 0.01 X10*3/UL (ref 0–0.5)
IMM GRANULOCYTES NFR BLD AUTO: 0.2 % (ref 0–0.9)
INSULIN SERPL-ACNC: 3 UIU/ML (ref 3–25)
LYMPHOCYTES # BLD AUTO: 0.87 X10*3/UL (ref 0.8–3)
LYMPHOCYTES NFR BLD AUTO: 15.9 %
MCH RBC QN AUTO: 34.7 PG (ref 26–34)
MCHC RBC AUTO-ENTMCNC: 32.7 G/DL (ref 32–36)
MCV RBC AUTO: 106 FL (ref 80–100)
MONOCYTES # BLD AUTO: 0.76 X10*3/UL (ref 0.05–0.8)
MONOCYTES NFR BLD AUTO: 13.9 %
NEUTROPHILS # BLD AUTO: 3.64 X10*3/UL (ref 1.6–5.5)
NEUTROPHILS NFR BLD AUTO: 66.7 %
NRBC BLD-RTO: 0 /100 WBCS (ref 0–0)
PLATELET # BLD AUTO: 234 X10*3/UL (ref 150–450)
POTASSIUM SERPL-SCNC: 4.2 MMOL/L (ref 3.5–5.3)
PROT SERPL-MCNC: 6.2 G/DL (ref 6.4–8.2)
RBC # BLD AUTO: 3.8 X10*6/UL (ref 4–5.2)
SODIUM SERPL-SCNC: 143 MMOL/L (ref 136–145)
TSH SERPL-ACNC: 2.08 MIU/L (ref 0.44–3.98)
VIT B12 SERPL-MCNC: 891 PG/ML (ref 211–911)
WBC # BLD AUTO: 5.5 X10*3/UL (ref 4.4–11.3)

## 2024-10-23 PROCEDURE — 36415 COLL VENOUS BLD VENIPUNCTURE: CPT

## 2024-10-23 PROCEDURE — 85025 COMPLETE CBC W/AUTO DIFF WBC: CPT

## 2024-10-23 PROCEDURE — 83704 LIPOPROTEIN BLD QUAN PART: CPT

## 2024-10-23 PROCEDURE — 83695 ASSAY OF LIPOPROTEIN(A): CPT

## 2024-10-23 PROCEDURE — 83090 ASSAY OF HOMOCYSTEINE: CPT

## 2024-10-25 LAB — APO B100 SERPL-MCNC: 92 MG/DL (ref 60–117)

## 2024-10-28 LAB
CHOLEST SERPL-MCNC: 189 MG/DL (ref 100–199)
HDL SERPL-SCNC: 28.5 UMOL/L
HDLC SERPL-MCNC: 57 MG/DL
LDL SERPL QN: 21.8 NM
LDL SERPL-SCNC: 1387 NMOL/L
LDL SMALL SERPL-SCNC: 321 NMOL/L
LDLC SERPL CALC-MCNC: 117 MG/DL (ref 0–99)
LP-IR SCORE SERPL: <25
TRIGL SERPL-MCNC: 81 MG/DL (ref 0–149)

## 2024-10-31 ENCOUNTER — OFFICE VISIT (OUTPATIENT)
Dept: GASTROENTEROLOGY | Facility: CLINIC | Age: 79
End: 2024-10-31
Payer: MEDICARE

## 2024-10-31 VITALS
SYSTOLIC BLOOD PRESSURE: 118 MMHG | HEIGHT: 64 IN | BODY MASS INDEX: 19.81 KG/M2 | TEMPERATURE: 97.3 F | RESPIRATION RATE: 16 BRPM | DIASTOLIC BLOOD PRESSURE: 68 MMHG | HEART RATE: 67 BPM | WEIGHT: 116 LBS

## 2024-10-31 DIAGNOSIS — R13.12 OROPHARYNGEAL DYSPHAGIA: ICD-10-CM

## 2024-10-31 PROCEDURE — 1159F MED LIST DOCD IN RCRD: CPT | Performed by: NURSE PRACTITIONER

## 2024-10-31 PROCEDURE — 3078F DIAST BP <80 MM HG: CPT | Performed by: NURSE PRACTITIONER

## 2024-10-31 PROCEDURE — 99213 OFFICE O/P EST LOW 20 MIN: CPT | Performed by: NURSE PRACTITIONER

## 2024-10-31 PROCEDURE — 1036F TOBACCO NON-USER: CPT | Performed by: NURSE PRACTITIONER

## 2024-10-31 PROCEDURE — 3074F SYST BP LT 130 MM HG: CPT | Performed by: NURSE PRACTITIONER

## 2024-10-31 PROCEDURE — 1126F AMNT PAIN NOTED NONE PRSNT: CPT | Performed by: NURSE PRACTITIONER

## 2024-10-31 PROCEDURE — 99203 OFFICE O/P NEW LOW 30 MIN: CPT | Performed by: NURSE PRACTITIONER

## 2024-10-31 ASSESSMENT — ENCOUNTER SYMPTOMS
FEVER: 0
EYES NEGATIVE: 1
CHEST TIGHTNESS: 0
RESPIRATORY NEGATIVE: 1
FATIGUE: 0
CHILLS: 0
MUSCULOSKELETAL NEGATIVE: 1
SHORTNESS OF BREATH: 0
DIFFICULTY URINATING: 0
APNEA: 0
CARDIOVASCULAR NEGATIVE: 1
COUGH: 0
PSYCHIATRIC NEGATIVE: 1
HEMATOLOGIC/LYMPHATIC NEGATIVE: 1
ROS GI COMMENTS: SEE HPI
ALLERGIC/IMMUNOLOGIC NEGATIVE: 1
ENDOCRINE NEGATIVE: 1
WHEEZING: 0
DIAPHORESIS: 0
STRIDOR: 0
NEUROLOGICAL NEGATIVE: 1

## 2024-10-31 ASSESSMENT — PAIN SCALES - GENERAL: PAINLEVEL_OUTOF10: 0-NO PAIN

## 2024-11-11 ENCOUNTER — APPOINTMENT (OUTPATIENT)
Dept: OBSTETRICS AND GYNECOLOGY | Facility: CLINIC | Age: 79
End: 2024-11-11
Payer: MEDICARE

## 2024-11-11 VITALS
BODY MASS INDEX: 19.97 KG/M2 | SYSTOLIC BLOOD PRESSURE: 112 MMHG | DIASTOLIC BLOOD PRESSURE: 60 MMHG | WEIGHT: 117 LBS | HEIGHT: 64 IN

## 2024-11-11 DIAGNOSIS — N32.81 OVERACTIVE BLADDER: ICD-10-CM

## 2024-11-11 PROCEDURE — 99212 OFFICE O/P EST SF 10 MIN: CPT | Performed by: NURSE PRACTITIONER

## 2024-11-11 PROCEDURE — 1159F MED LIST DOCD IN RCRD: CPT | Performed by: NURSE PRACTITIONER

## 2024-11-11 PROCEDURE — 1160F RVW MEDS BY RX/DR IN RCRD: CPT | Performed by: NURSE PRACTITIONER

## 2024-11-11 PROCEDURE — 1126F AMNT PAIN NOTED NONE PRSNT: CPT | Performed by: NURSE PRACTITIONER

## 2024-11-11 PROCEDURE — 1036F TOBACCO NON-USER: CPT | Performed by: NURSE PRACTITIONER

## 2024-11-11 PROCEDURE — 3074F SYST BP LT 130 MM HG: CPT | Performed by: NURSE PRACTITIONER

## 2024-11-11 PROCEDURE — 3078F DIAST BP <80 MM HG: CPT | Performed by: NURSE PRACTITIONER

## 2024-11-11 RX ORDER — TROSPIUM CHLORIDE 20 MG/1
20 TABLET, FILM COATED ORAL 2 TIMES DAILY
Qty: 180 TABLET | Refills: 3 | Status: SHIPPED | OUTPATIENT
Start: 2024-11-11 | End: 2025-11-11

## 2024-11-11 ASSESSMENT — PAIN SCALES - GENERAL: PAINLEVEL_OUTOF10: 0-NO PAIN

## 2024-11-11 NOTE — PROGRESS NOTES
"HISTORY OF PRESENT ILLNESS:  Harmeet Conner is a 79 y.o. female, who presents in follow up for OAB.      During last encounter on 7/8/24, reviewed and agreed to the following: OAB - Trospium 10 mg BID was managing OAB. She was previously most bothered by frequency especially at nighttime, but Trospium helped. Trace blood at prior visit on 5/2/24 - UA negative for blood. No need for hematuria workup. 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      Today she reports   - She reports Trospium BID is still working for her. Patient corrects our record and states that she is taking 20 mg BID instead of 10 mg BID.   - Patient is able to sleep better throughout the night and stay dry.   - Denies side effects of Trospium.           PHYSICAL EXAMINATION:  No LMP recorded. Patient is postmenopausal.  Body mass index is 20.08 kg/m².  /60   Ht 1.626 m (5' 4\")   Wt 53.1 kg (117 lb)   BMI 20.08 kg/m²     Physical Exam  Constitutional:       Appearance: Normal appearance. She is normal weight.   Pulmonary:      Effort: Pulmonary effort is normal.   Neurological:      Mental Status: She is alert.   Psychiatric:         Mood and Affect: Mood normal.         Behavior: Behavior normal.         Thought Content: Thought content normal.         Judgment: Judgment normal.     IMPRESSION AND PLAN:  Harmeet Conner is a 79 y.o. who is being treated for OAB.     Plan    1. OAB  - Continue Trospium 20 mg BID as this is managing her OAB symptoms. Sent 90 day refills x1 year.   - She is satisfied with her bladder symptoms.      All questions and concerns were answered and addressed.  The patient expressed understanding and agrees with the plan.      Follow-up in 1 year or sooner PRN with Mary Welch, APRN-CNP.     Scribe Attestation:   I, Ann-Marie Baron, am scribing for virtually, and in the presence of Mary " DEJON Welch on 11/11/2024 at 11:26 AM.     I, Mary Welch, personally performed the services described in the documentation as scribed in my presence and confirm it is both complete and accurate.

## 2024-11-11 NOTE — PATIENT INSTRUCTIONS
"To reach the Urogynecology nurses for Dr. Anand and Mary Welch, call [phone]. You will then select option 2 to be connected to the your provider's office and then option 3 for \"[facility]\". This will connect you with Altagracia or Monet Muller.         "

## 2024-12-04 ENCOUNTER — APPOINTMENT (OUTPATIENT)
Dept: PRIMARY CARE | Facility: CLINIC | Age: 79
End: 2024-12-04
Payer: MEDICARE

## 2024-12-04 VITALS
OXYGEN SATURATION: 98 % | DIASTOLIC BLOOD PRESSURE: 68 MMHG | HEART RATE: 68 BPM | HEIGHT: 64 IN | RESPIRATION RATE: 16 BRPM | WEIGHT: 119 LBS | BODY MASS INDEX: 20.32 KG/M2 | SYSTOLIC BLOOD PRESSURE: 110 MMHG | TEMPERATURE: 97.9 F

## 2024-12-04 DIAGNOSIS — I44.2 COMPLETE ATRIOVENTRICULAR BLOCK (MULTI): ICD-10-CM

## 2024-12-04 DIAGNOSIS — Z13.220 SCREENING FOR HYPERLIPIDEMIA: ICD-10-CM

## 2024-12-04 DIAGNOSIS — Z00.00 MEDICARE ANNUAL WELLNESS VISIT, INITIAL: ICD-10-CM

## 2024-12-04 DIAGNOSIS — Z00.00 ROUTINE GENERAL MEDICAL EXAMINATION AT HEALTH CARE FACILITY: Primary | ICD-10-CM

## 2024-12-04 DIAGNOSIS — R53.81 PHYSICAL DEBILITY: ICD-10-CM

## 2024-12-04 DIAGNOSIS — I10 HTN (HYPERTENSION), BENIGN: ICD-10-CM

## 2024-12-04 PROCEDURE — 3074F SYST BP LT 130 MM HG: CPT | Performed by: FAMILY MEDICINE

## 2024-12-04 PROCEDURE — G0439 PPPS, SUBSEQ VISIT: HCPCS | Performed by: FAMILY MEDICINE

## 2024-12-04 PROCEDURE — 3078F DIAST BP <80 MM HG: CPT | Performed by: FAMILY MEDICINE

## 2024-12-04 PROCEDURE — 1170F FXNL STATUS ASSESSED: CPT | Performed by: FAMILY MEDICINE

## 2024-12-04 PROCEDURE — 1159F MED LIST DOCD IN RCRD: CPT | Performed by: FAMILY MEDICINE

## 2024-12-04 PROCEDURE — 1124F ACP DISCUSS-NO DSCNMKR DOCD: CPT | Performed by: FAMILY MEDICINE

## 2024-12-04 ASSESSMENT — ENCOUNTER SYMPTOMS
GASTROINTESTINAL NEGATIVE: 1
PSYCHIATRIC NEGATIVE: 1
ENDOCRINE NEGATIVE: 1
RESPIRATORY NEGATIVE: 1
LOSS OF SENSATION IN FEET: 0
DEPRESSION: 0
CONSTITUTIONAL NEGATIVE: 1
OCCASIONAL FEELINGS OF UNSTEADINESS: 0
CARDIOVASCULAR NEGATIVE: 1
EYES NEGATIVE: 1
MUSCULOSKELETAL NEGATIVE: 1
NEUROLOGICAL NEGATIVE: 1
ALLERGIC/IMMUNOLOGIC NEGATIVE: 1
HEMATOLOGIC/LYMPHATIC NEGATIVE: 1

## 2024-12-04 ASSESSMENT — ACTIVITIES OF DAILY LIVING (ADL)
TAKING_MEDICATION: INDEPENDENT
MANAGING_FINANCES: INDEPENDENT
BATHING: INDEPENDENT
DRESSING: INDEPENDENT
DOING_HOUSEWORK: INDEPENDENT
GROCERY_SHOPPING: INDEPENDENT

## 2024-12-04 ASSESSMENT — PATIENT HEALTH QUESTIONNAIRE - PHQ9
2. FEELING DOWN, DEPRESSED OR HOPELESS: NOT AT ALL
SUM OF ALL RESPONSES TO PHQ9 QUESTIONS 1 AND 2: 0
1. LITTLE INTEREST OR PLEASURE IN DOING THINGS: NOT AT ALL

## 2024-12-04 NOTE — PROGRESS NOTES
"Subjective   Reason for Visit: Harmeet Conner is an 79 y.o. female here for a Medicare Wellness visit.      Reviewed all medications by prescribing practitioner or clinical pharmacist (such as prescriptions, OTCs, herbal therapies and supplements) and documented in the medical record.      Patient Care Team:  Lilliana Salinas DO as PCP - General  Lilliana Salinas DO as PCP - [facility] [place]  Sunny Dumont MD as Cardiologist (Electrophysiology)     Review of Systems   Constitutional: Negative.    HENT: Negative.     Eyes: Negative.    Respiratory: Negative.     Cardiovascular: Negative.    Gastrointestinal: Negative.    Endocrine: Negative.    Genitourinary: Negative.    Musculoskeletal: Negative.    Skin: Negative.    Allergic/Immunologic: Negative.    Neurological: Negative.    Hematological: Negative.    Psychiatric/Behavioral: Negative.         Objective   Vitals:  /68 (BP Location: Right arm, Patient Position: Sitting)   Pulse 68   Temp 36.6 °C (97.9 °F) (Temporal)   Resp 16   Ht 1.626 m (5' 4\")   Wt 54 kg (119 lb)   SpO2 98%   BMI 20.43 kg/m²       Physical Exam  Constitutional:       Appearance: Normal appearance. She is normal weight.   HENT:      Right Ear: Ear canal and external ear normal. There is impacted cerumen.      Left Ear: Tympanic membrane, ear canal and external ear normal.      Nose: Nose normal.      Mouth/Throat:      Mouth: Mucous membranes are dry.      Pharynx: Oropharynx is clear.   Cardiovascular:      Rate and Rhythm: Normal rate and regular rhythm.      Pulses: Normal pulses.      Heart sounds: Murmur (Already documented.) heard.   Pulmonary:      Effort: Pulmonary effort is normal.      Breath sounds: Normal breath sounds.   Musculoskeletal:         General: Normal range of motion.      Cervical back: Normal range of motion.   Skin:     General: Skin is warm and dry.   Neurological:      General: No focal deficit present.      Mental Status: She is " alert and oriented to person, place, and time. Mental status is at baseline.   Psychiatric:         Mood and Affect: Mood normal.         Behavior: Behavior normal.         Thought Content: Thought content normal.         Judgment: Judgment normal.         Assessment & Plan  Medicare annual wellness visit, initial         HTN (hypertension), benign    Orders:    CBC; Future    Routine general medical examination at health care facility    Orders:    1 Year Follow Up In Primary Care - Wellness Exam; Future    Comprehensive Metabolic Panel; Future    Screening for hyperlipidemia    Orders:    Lipid Panel; Future    Complete atrioventricular block (Multi)  Has a pacemaker. Sees Dr. Dumont         Physical debility    Orders:    Disability Placard

## 2024-12-15 DIAGNOSIS — R25.1 TREMOR: ICD-10-CM

## 2024-12-16 RX ORDER — PROPRANOLOL HYDROCHLORIDE 40 MG/1
40 TABLET ORAL 2 TIMES DAILY
Qty: 60 TABLET | Refills: 3 | Status: SHIPPED | OUTPATIENT
Start: 2024-12-16

## 2024-12-27 ENCOUNTER — HOSPITAL ENCOUNTER (OUTPATIENT)
Dept: CARDIOLOGY | Facility: HOSPITAL | Age: 79
Discharge: HOME | End: 2024-12-27
Payer: MEDICARE

## 2024-12-27 ENCOUNTER — APPOINTMENT (OUTPATIENT)
Facility: CLINIC | Age: 79
End: 2024-12-27
Payer: MEDICARE

## 2024-12-27 VITALS — BODY MASS INDEX: 20.32 KG/M2 | HEIGHT: 64 IN | WEIGHT: 119 LBS

## 2024-12-27 DIAGNOSIS — I44.2 ATRIOVENTRICULAR BLOCK, COMPLETE (MULTI): ICD-10-CM

## 2024-12-27 DIAGNOSIS — R13.12 OROPHARYNGEAL DYSPHAGIA: ICD-10-CM

## 2024-12-27 DIAGNOSIS — Z95.0 CARDIAC PACEMAKER IN SITU: ICD-10-CM

## 2024-12-27 PROCEDURE — 1160F RVW MEDS BY RX/DR IN RCRD: CPT | Performed by: OTOLARYNGOLOGY

## 2024-12-27 PROCEDURE — 93296 REM INTERROG EVL PM/IDS: CPT

## 2024-12-27 PROCEDURE — 1123F ACP DISCUSS/DSCN MKR DOCD: CPT | Performed by: OTOLARYNGOLOGY

## 2024-12-27 PROCEDURE — 31575 DIAGNOSTIC LARYNGOSCOPY: CPT | Performed by: OTOLARYNGOLOGY

## 2024-12-27 PROCEDURE — 99204 OFFICE O/P NEW MOD 45 MIN: CPT | Performed by: OTOLARYNGOLOGY

## 2024-12-27 PROCEDURE — 1159F MED LIST DOCD IN RCRD: CPT | Performed by: OTOLARYNGOLOGY

## 2024-12-27 NOTE — LETTER
December 28, 2024     Radha Teixeira, APRN-CNP  [address]  [facility] [place] [address]  [facility] [id number]    Patient: Harmeet Conner   YOB: 1945   Date of Visit: 12/27/2024       Dear Dr. Radha Teixeira, APRN-CNP:    Thank you for referring Harmeet Conner to me for evaluation. Below are my notes for this consultation.  If you have questions, please do not hesitate to call me. I look forward to following your patient along with you.       Sincerely,     Gerson Álvarez MD      CC: No Recipients  ______________________________________________________________________________________    ENT Outpatient Consultation    Chief Complaint: Dysphagia  History Of Present Illness  Harmeet Conner is a 79 y.o. female presents for evaluation of dysphagia.  She was referred by Radha Teixeira.  Patient has a history of dysphagia over the last 2 and half years that has been fairly stable.  She has had workup including a modified barium swallow and upper endoscopy.  She is referred here today for evaluation and scope exam.  She describes these episodes as intermittent and based on her description sounds like a muscular spasm that prevents her from swallowing easily.  She has not had issues with aspiration.  Her modified barium swallow was evaluated and she had some residual barium in her esophagus after the test.  An esophagram was also ordered but she has not had this completed yet.  There were no concerning findings in the esophagus on upper endoscopy and no evidence of a hiatal hernia    She takes reflux medication daily.  She did have 2 episodes of waking up with chest discomfort.  This prompted an emergency room visit but cardiac workup was negative.  She does not eat late-night meals.  She does have a history of snoring.  There is no evidence of hiatal hernia on her endoscopy     Past Medical History  She has a past medical history of Encounter for immunization (10/07/2020), Encounter for immunization  (09/24/2021), Lesion of sciatic nerve, right lower limb, Other chest pain (03/18/2022), Other chest pain (10/01/2021), Personal history of COVID-19 (12/04/2023), Personal history of malignant neoplasm of breast, Personal history of non-Hodgkin lymphomas, Personal history of other diseases of the circulatory system (03/18/2022), Personal history of other diseases of the musculoskeletal system and connective tissue, Personal history of other diseases of the nervous system and sense organs, Personal history of other diseases of the nervous system and sense organs, Personal history of other endocrine, nutritional and metabolic disease, Personal history of other infectious and parasitic diseases (08/30/2019), and Unspecified atrioventricular block.    Surgical History  She has a past surgical history that includes Other surgical history (08/05/2019); Other surgical history (08/05/2019); Other surgical history (Left, 01/01/2000); Other surgical history (02/24/2022); Other surgical history (02/24/2022); Other surgical history (02/24/2022); Other surgical history (08/27/2020); and Cholecystectomy (2008?).     Social History  She reports that she has never smoked. She has never been exposed to tobacco smoke. She has never used smokeless tobacco. She reports that she does not drink alcohol and does not use drugs.    Family History  Family History   Problem Relation Name Age of Onset   • Colon polyps Mother Bing    • Esophageal cancer Father Manoj         Allergies  Iodinated contrast media, Iodine, Benztropine, Gabapentin, Nystatin, Paroxetine hcl, and Prochlorperazine     Physical Exam:  CONSTITUTIONAL:  No acute distress  VOICE:  No hoarseness or other abnormality  RESPIRATION:  Breathing comfortably, no stridor  CV:  No clubbing/cyanosis/edema in hands  EYES:  EOM intact, sclera normal  NEURO:  Alert and oriented times 3, Cranial nerves II-XII grossly intact and symmetric bilaterally  HEAD AND FACE:  Symmetric facial  "features, no masses or lesions, sinuses non-tender to palpation  SALIVARY GLANDS:  Parotid and submandibular glands normal bilaterally  EARS:  Normal external ears, external auditory canals, and TMs to otoscopy, normal hearing to whispered voice.  NOSE:  External nose midline, anterior rhinoscopy is normal with limited visualization to the anterior aspect of the interior turbinates, no bleeding or drainage, no lesions  ORAL CAVITY/OROPHARYNX/LIPS:  Normal mucous membranes, normal floor of mouth/tongue/OP, no masses or lesions  PHARYNGEAL WALLS:  No masses or lesions  NECK/LYMPH:  No LAD, no thyroid masses, trachea midline  SKIN:  Neck skin is without scar or injury  PSYCH:  Alert and oriented with appropriate mood and affect    Procedure Note: Flexible Nasolaryngoscopy  Verbal informed consent was obtained from the patient/patient's guardian. 4% lidocaine mixed with phenylephrine was prepared and dripped into the nose. It was placed in the right naris. Following an appropriate amount of time to allow for adequate anesthesia, a flexible fiberoptic nasolaryngoscope was placed into the patient's right naris. The nasal cavity, nasopharynx, oropharynx, hypopharynx, and all endolaryngeal structures were visualized and were normal except as listed below. Significant findings included:  -Vocal cords mobile bilaterally, evidence of thinning  -No concerning mucosal lesions    Last Recorded Vitals  Height 1.626 m (5' 4\"), weight 54 kg (119 lb).    Relevant Results    Reviewed documentation from upper endoscopy  Reviewed CT scan of the chest  Reviewed modified barium swallow  Assessment and Plan  79 y.o. female with 2-1/2-year history of dysphagia.  Her description sounds similar to a muscle spasm and symptoms occur intermittently.  There are no abnormalities on scope exam.    -Reviewed prior workup and scope exam.  Do not see anything concerning on exam  -We discussed the role of esophagram as part of additional workup.  She " is not sure his symptoms are severe enough to make her do additional interventions (medical or surgical) so she might hold off on additional testing  -Provided her with my partners information (Dr. Meneses) so that she can pursue evaluation if her symptoms get worse  -She will follow-up with me as needed    Gerson Álvarez MD

## 2024-12-27 NOTE — PROGRESS NOTES
ENT Outpatient Consultation    Chief Complaint: Dysphagia  History Of Present Illness  Harmeet Conner is a 79 y.o. female presents for evaluation of dysphagia.  She was referred by Radha Teixeira.  Patient has a history of dysphagia over the last 2 and half years that has been fairly stable.  She has had workup including a modified barium swallow and upper endoscopy.  She is referred here today for evaluation and scope exam.  She describes these episodes as intermittent and based on her description sounds like a muscular spasm that prevents her from swallowing easily.  She has not had issues with aspiration.  Her modified barium swallow was evaluated and she had some residual barium in her esophagus after the test.  An esophagram was also ordered but she has not had this completed yet.  There were no concerning findings in the esophagus on upper endoscopy and no evidence of a hiatal hernia    She takes reflux medication daily.  She did have 2 episodes of waking up with chest discomfort.  This prompted an emergency room visit but cardiac workup was negative.  She does not eat late-night meals.  She does have a history of snoring.  There is no evidence of hiatal hernia on her endoscopy     Past Medical History  She has a past medical history of Encounter for immunization (10/07/2020), Encounter for immunization (09/24/2021), Lesion of sciatic nerve, right lower limb, Other chest pain (03/18/2022), Other chest pain (10/01/2021), Personal history of COVID-19 (12/04/2023), Personal history of malignant neoplasm of breast, Personal history of non-Hodgkin lymphomas, Personal history of other diseases of the circulatory system (03/18/2022), Personal history of other diseases of the musculoskeletal system and connective tissue, Personal history of other diseases of the nervous system and sense organs, Personal history of other diseases of the nervous system and sense organs, Personal history of other endocrine, nutritional and  metabolic disease, Personal history of other infectious and parasitic diseases (08/30/2019), and Unspecified atrioventricular block.    Surgical History  She has a past surgical history that includes Other surgical history (08/05/2019); Other surgical history (08/05/2019); Other surgical history (Left, 01/01/2000); Other surgical history (02/24/2022); Other surgical history (02/24/2022); Other surgical history (02/24/2022); Other surgical history (08/27/2020); and Cholecystectomy (2008?).     Social History  She reports that she has never smoked. She has never been exposed to tobacco smoke. She has never used smokeless tobacco. She reports that she does not drink alcohol and does not use drugs.    Family History  Family History   Problem Relation Name Age of Onset    Colon polyps Mother Bing     Esophageal cancer Father Manoj         Allergies  Iodinated contrast media, Iodine, Benztropine, Gabapentin, Nystatin, Paroxetine hcl, and Prochlorperazine     Physical Exam:  CONSTITUTIONAL:  No acute distress  VOICE:  No hoarseness or other abnormality  RESPIRATION:  Breathing comfortably, no stridor  CV:  No clubbing/cyanosis/edema in hands  EYES:  EOM intact, sclera normal  NEURO:  Alert and oriented times 3, Cranial nerves II-XII grossly intact and symmetric bilaterally  HEAD AND FACE:  Symmetric facial features, no masses or lesions, sinuses non-tender to palpation  SALIVARY GLANDS:  Parotid and submandibular glands normal bilaterally  EARS:  Normal external ears, external auditory canals, and TMs to otoscopy, normal hearing to whispered voice.  NOSE:  External nose midline, anterior rhinoscopy is normal with limited visualization to the anterior aspect of the interior turbinates, no bleeding or drainage, no lesions  ORAL CAVITY/OROPHARYNX/LIPS:  Normal mucous membranes, normal floor of mouth/tongue/OP, no masses or lesions  PHARYNGEAL WALLS:  No masses or lesions  NECK/LYMPH:  No LAD, no thyroid masses, trachea  "midline  SKIN:  Neck skin is without scar or injury  PSYCH:  Alert and oriented with appropriate mood and affect    Procedure Note: Flexible Nasolaryngoscopy  Verbal informed consent was obtained from the patient/patient's guardian. 4% lidocaine mixed with phenylephrine was prepared and dripped into the nose. It was placed in the right naris. Following an appropriate amount of time to allow for adequate anesthesia, a flexible fiberoptic nasolaryngoscope was placed into the patient's right naris. The nasal cavity, nasopharynx, oropharynx, hypopharynx, and all endolaryngeal structures were visualized and were normal except as listed below. Significant findings included:  -Vocal cords mobile bilaterally, evidence of thinning  -No concerning mucosal lesions    Last Recorded Vitals  Height 1.626 m (5' 4\"), weight 54 kg (119 lb).    Relevant Results    Reviewed documentation from upper endoscopy  Reviewed CT scan of the chest  Reviewed modified barium swallow  Assessment and Plan  79 y.o. female with 2-1/2-year history of dysphagia.  Her description sounds similar to a muscle spasm and symptoms occur intermittently.  There are no abnormalities on scope exam.    -Reviewed prior workup and scope exam.  Do not see anything concerning on exam  -We discussed the role of esophagram as part of additional workup.  She is not sure his symptoms are severe enough to make her do additional interventions (medical or surgical) so she might hold off on additional testing  -Provided her with my partners information (Dr. Meneses) so that she can pursue evaluation if her symptoms get worse  -She will follow-up with me as needed    Gerson Álvarez MD    "

## 2025-02-19 ENCOUNTER — APPOINTMENT (OUTPATIENT)
Dept: NEUROLOGY | Facility: CLINIC | Age: 80
End: 2025-02-19
Payer: MEDICARE

## 2025-02-23 DIAGNOSIS — R13.12 OROPHARYNGEAL DYSPHAGIA: ICD-10-CM

## 2025-02-24 RX ORDER — ESOMEPRAZOLE MAGNESIUM 40 MG/1
CAPSULE, DELAYED RELEASE ORAL
Qty: 30 CAPSULE | Refills: 5 | Status: SHIPPED | OUTPATIENT
Start: 2025-02-24

## 2025-02-25 ENCOUNTER — HOSPITAL ENCOUNTER (OUTPATIENT)
Dept: CARDIOLOGY | Facility: HOSPITAL | Age: 80
Discharge: HOME | End: 2025-02-25
Payer: MEDICARE

## 2025-02-25 DIAGNOSIS — Z95.0 CARDIAC PACEMAKER IN SITU: ICD-10-CM

## 2025-02-25 DIAGNOSIS — I44.2 ATRIOVENTRICULAR BLOCK, COMPLETE (MULTI): ICD-10-CM

## 2025-04-03 ENCOUNTER — OFFICE VISIT (OUTPATIENT)
Facility: CLINIC | Age: 80
End: 2025-04-03
Payer: MEDICARE

## 2025-04-03 ENCOUNTER — HOSPITAL ENCOUNTER (OUTPATIENT)
Dept: CARDIOLOGY | Facility: HOSPITAL | Age: 80
Discharge: HOME | End: 2025-04-03
Payer: MEDICARE

## 2025-04-03 VITALS
BODY MASS INDEX: 19.97 KG/M2 | OXYGEN SATURATION: 97 % | HEART RATE: 67 BPM | DIASTOLIC BLOOD PRESSURE: 75 MMHG | RESPIRATION RATE: 18 BRPM | SYSTOLIC BLOOD PRESSURE: 125 MMHG | WEIGHT: 117 LBS | TEMPERATURE: 97.8 F | HEIGHT: 64 IN

## 2025-04-03 DIAGNOSIS — Z78.9 NOT IMMUNE TO MEASLES: ICD-10-CM

## 2025-04-03 DIAGNOSIS — Z95.0 CARDIAC PACEMAKER IN SITU: ICD-10-CM

## 2025-04-03 DIAGNOSIS — I44.2 ATRIOVENTRICULAR BLOCK, COMPLETE (MULTI): ICD-10-CM

## 2025-04-03 DIAGNOSIS — H61.23 BILATERAL IMPACTED CERUMEN: Primary | ICD-10-CM

## 2025-04-03 LAB — BODY SURFACE AREA: 1.55 M2

## 2025-04-03 PROCEDURE — 1123F ACP DISCUSS/DSCN MKR DOCD: CPT | Performed by: FAMILY MEDICINE

## 2025-04-03 PROCEDURE — 99213 OFFICE O/P EST LOW 20 MIN: CPT | Performed by: FAMILY MEDICINE

## 2025-04-03 PROCEDURE — 3078F DIAST BP <80 MM HG: CPT | Performed by: FAMILY MEDICINE

## 2025-04-03 PROCEDURE — 3074F SYST BP LT 130 MM HG: CPT | Performed by: FAMILY MEDICINE

## 2025-04-03 PROCEDURE — 93296 REM INTERROG EVL PM/IDS: CPT

## 2025-04-03 ASSESSMENT — PATIENT HEALTH QUESTIONNAIRE - PHQ9
SUM OF ALL RESPONSES TO PHQ9 QUESTIONS 1 AND 2: 0
2. FEELING DOWN, DEPRESSED OR HOPELESS: NOT AT ALL
1. LITTLE INTEREST OR PLEASURE IN DOING THINGS: NOT AT ALL

## 2025-04-03 NOTE — PROGRESS NOTES
"Subjective     Patient ID: Harmeet Conner is a 79 y.o. female who presents for Cerumen Impaction.  Patient states on 3/28/25, she was at [facility] having an evaluation done for hearing aids. Patient stated they informed her that to properly test her hearing she would need to have the cerumen in both of her ears removed. Patient denies any fullness sensation in her ears, pain or drainage.     Patient also reports she has only had one MMR vaccine.         Objective     Vitals:    04/03/25 1212   BP: 125/75   BP Location: Right arm   Patient Position: Sitting   Pulse: 67   Resp: 18   Temp: 36.6 °C (97.8 °F)   TempSrc: Temporal   SpO2: 97%   Weight: 53.1 kg (117 lb)   Height: 1.626 m (5' 4\")        Current Outpatient Medications   Medication Instructions    alendronate (Fosamax) 70 mg tablet once per week in am, 8oz water, empty stomach, nothing by mouth and do not lie down for the next 30 minutes.    aspirin 81 mg, 2 times weekly    calcium carbonate (Oscal) 500 mg calcium (1,250 mg) tablet Take by mouth.    cholecalciferol, vitamin D3, 250 mcg (10,000 unit) tablet Take by mouth.    DOCOSAHEXAENOIC ACID ORAL 2 capsules, Daily RT    esomeprazole (NexIUM) 40 mg DR capsule TAKE ONE CAPSULE BY MOUTH EVERY MORNING. TAKE BEFORE MEALS. DO NOT OPEN CAPSULE    magnesium gluconate, bulk, powder Transdermal.    propranolol (INDERAL) 40 mg, oral, 2 times daily    pyridoxine (Vitamin B-6) 250 mg tablet Take by mouth.    trospium (SANCTURA) 20 mg, oral, 2 times daily    valACYclovir (Valtrex) 500 mg tablet 1 tablet, 2 times daily    VITAMIN B COMPLEX ORAL 1 capsule, Daily RT        Physical Exam  Constitutional:       Appearance: Normal appearance.   HENT:      Head: Normocephalic and atraumatic.      Right Ear: Tympanic membrane normal.      Left Ear: Tympanic membrane normal.      Nose: Nose normal.   Cardiovascular:      Rate and Rhythm: Normal rate and regular rhythm.   Pulmonary:      Effort: Pulmonary effort is normal.      Breath " sounds: Normal breath sounds.   Neurological:      Mental Status: She is alert.         Lab Results   Component Value Date    WBC 5.5 10/23/2024    RBC 3.80 (L) 10/23/2024    HGB 13.2 10/23/2024    HCT 40.4 10/23/2024     (H) 10/23/2024    MCH 34.7 (H) 10/23/2024    MCHC 32.7 10/23/2024     10/23/2024     Lab Results   Component Value Date    GLUCOSE 91 10/23/2024     10/23/2024    K 4.2 10/23/2024     10/23/2024    CO2 32 10/23/2024    ANIONGAP 11 10/23/2024    BUN 23 10/23/2024    CREATININE 0.76 10/23/2024    CALCIUM 9.1 10/23/2024    ALBUMIN 3.7 10/23/2024    ALKPHOS 41 10/23/2024    PROT 6.2 (L) 10/23/2024    AST 19 10/23/2024    BILITOT 0.6 10/23/2024    ALT 15 10/23/2024      Lab Results   Component Value Date    CHOL 214 (H) 12/27/2022    HDL 66.2 12/27/2022    CHHDL 3.2 12/27/2022    LDLF 129 (H) 12/27/2022    VLDL 19 12/27/2022    TRIG 95 12/27/2022     Lab Results   Component Value Date    TSH 2.08 10/23/2024      Lab Results   Component Value Date    VITD25 73 10/23/2024      Lab Results   Component Value Date    HGBA1C 5.5 10/23/2024    XCEBCMXI1C 111 10/23/2024         Assessment/Plan   Assessment & Plan  Bilateral impacted cerumen         Not immune to measles    Orders:    Measles ([place]) Antibody, IgG; Future

## 2025-04-09 LAB — MEV IGG SER IA-ACNC: >300 AU/ML

## 2025-05-01 ENCOUNTER — APPOINTMENT (OUTPATIENT)
Dept: HEMATOLOGY/ONCOLOGY | Facility: CLINIC | Age: 80
End: 2025-05-01
Payer: MEDICARE

## 2025-05-01 DIAGNOSIS — R25.1 TREMOR: ICD-10-CM

## 2025-05-01 RX ORDER — PROPRANOLOL HYDROCHLORIDE 40 MG/1
40 TABLET ORAL 2 TIMES DAILY
Qty: 60 TABLET | Refills: 3 | Status: SHIPPED | OUTPATIENT
Start: 2025-05-01

## 2025-05-01 NOTE — PROGRESS NOTES
Patient ID: Harmeet Conner is a 79 y.o. female.    Oncology History Overview Note  1. 1961: NHL s/p 3600 centigray of radiation (cobalt) to the left chest during a 3 week period at the age of 15 years.  2. March 2000: Left breast: 3 cm invasive lobular carcinoma, %, VA 95%, HER2 2+, 1/6 LN+ (questionable focus in second LN), s/p MRM.  3. May 2000: AC x 4 followed by Taxol x 4 q 3 weeks adjuvant.  4. November 2000: Tamoxifen 20mg daily.  5. August 2005 - July 2010: Femara 2.5mg daily.  6. 2013 -23andme testing and was found to be negative for any significant genetic markers    Subjective    HPI  Chief Complaint: history of left  breast cancer and non-Hodgkin's lymphoma    Ms. Harmeet Littlejohn is a 79 y/o F presenting for follow up for a history of left breast cancer and non-Hodgkin's lymphoma. History notable for Non-Hodgkin lymphoma (cobalt rad to L chest 1961) early stage breast cancer (2000 : T2N1 ER+VA+ Her2- invasive ca L s/p mastectomy adjuvant ACT and endocrine tamoxifen. Additional history includes syncope w intermittent CHB, HTN, DLP, OP.     Patient was initially diagnosed with non-Hodgkin's lymphoma at age 15 in 1951 status post radiation alone. Then in March 2000, she developed left breat cancer.  At that time, it was a 3 cm invasive lobular carcinoma, %, VA 95%, HER2 2+ with 1/6  lymph nodes involved. She was diagnosed at [facility] and underwent a modified ratified mastectomy followed by Adriamycin plus Cytoxan for 4 cycles and then Taxol every 3 weeks for 4 cycles. She was started on tamoxifen in November 2000 and then switched  to Femara in August 2005 which she continued until July 2010. She did undergo 23 and Me genetic testing in 2013 that was negative.      Patient reports that she has had residual breathing discomfort post COVID 2.5 years ago. She also reports some dysphagia that she is currently being worked up for. She is on low-dose omeprazole. She also reports feeling more fatigued  lately. She denies  any pain. No new lumps or bumps.      Interval History:   5/2/2024:  Patient presents for annual follow-up.  She reports that she has been feeling great.  She states that she has had no symptoms including chest pain, palpitations, shortness of breath, fevers, chills, unintentional weight loss or appetite changes, bone pain, nausea, vomiting, constipation, diarrhea, signs of bleeding, new lumps or bumps, changes with her breast or chest wall, edema or unusual headaches.  She states that her energy is good and that she remains active.  She reports that her dysphagia is just episodic and that her breathing has improved.    Patient has questions regarding gadolinium levels that she had tested in her blood that came back elevated and wondering if she should still continue breast MRIs based on these results.  She did not have her breast MRI as scheduled due to wanting to discuss this with us, it also appears per our system that she may be due for her right-sided screening mammogram as well.  Patient did see our onco-cardiologist in the past, she is aware that per that note she was recommended to follow-up in 1 year just for routine surveillance due to her history of radiation, patient states that she has no cardiac symptoms or concerns at this time.  We did discuss the importance of having her lipid levels monitored, routine exercise, healthy well-balanced diet, continuing not to smoke and limiting alcohol for heart health. She has her thyroid monitored routinely as well and follows with her primary provider routinely.    5/2/2025:  Presents for annual visit. She reports doing well with no concerns today. Denies recent infections. Denies any new or worsening pain, SOB, chest pain, palpitations, cough, edema, breast changes, new lumps/bumps, nausea, vomiting, diarrhea or constipation. She is asking about a breast MRI this year. She reports worsening neuropathy, which started with cancer treatment. It is  a constant tingling in her feet, worse in the summer and at night at times. She denies neuropathy in her hands. The neuropathy does not affect her walking or driving. She is interested in treatment options, can be sensitive to certain medications like gabapentin and antidepressants. She is due for breast imaging in July.        FHx: Maternal grandfather  of unknown cancer.     Social Hx: Never smoker.     Objective    Visit Vitals  OB Status Postmenopausal   Smoking Status Never     Visit Vitals  /57   Pulse 70   Temp 36.1 °C (97 °F) (Temporal)   Resp 16   Wt 54.3 kg (119 lb 11.4 oz)   SpO2 94% Comment: Room air   BMI 20.55 kg/m²   OB Status Postmenopausal   Smoking Status Never   BSA 1.57 m²       Physical Exam  Constitutional:       General: She is not in acute distress.     Appearance: Normal appearance.   HENT:      Head: Normocephalic.      Mouth/Throat:      Mouth: Mucous membranes are moist.      Pharynx: No oropharyngeal exudate or posterior oropharyngeal erythema.   Eyes:      General: No scleral icterus.     Pupils: Pupils are equal, round, and reactive to light.   Cardiovascular:      Rate and Rhythm: Normal rate and regular rhythm.   Pulmonary:      Effort: Pulmonary effort is normal. No respiratory distress.      Breath sounds: Normal breath sounds. No wheezing.   Chest:      Comments: Left breast is surgically removed. Left chest wall has no lumps or bumps, masses or skin rashes. Right breast is without firm nodules, skin dimpling or rashes. There is no nipple discharge. There is no cervical, clavicular or axillary lymphadenopathy present on exam.   Abdominal:      General: Abdomen is flat. Bowel sounds are normal. There is no distension.      Palpations: Abdomen is soft.      Tenderness: There is no abdominal tenderness.   Musculoskeletal:         General: Normal range of motion.      Cervical back: Normal range of motion and neck supple.   Skin:     General: Skin is warm and dry.    Neurological:      General: No focal deficit present.      Mental Status: She is alert and oriented to person, place, and time. Mental status is at baseline.      Motor: No weakness.      Gait: Gait normal.   Psychiatric:         Mood and Affect: Mood normal.         Behavior: Behavior normal.         Judgment: Judgment normal.         Assessment/Plan     1. Left breast invasive lobular carcinoma, 3 cm, %, SD 95, HER2 2+ with 1/6 lymph nodes     - Diagnosed in March 2000, status post modified radical mastectomy. AC x4 and Taxol every 3 weeks x4 completed in May 2000. Tamoxifen started November 2000. Completed Femara 2.5 from August 2005 to July 2010.   - Due to dense tissue she has been alternating between breast MRI and mammograms every 6 months.   - Discussed given she is 20 years out from initial diagnosis and even longer from her history of radiation back in 1961 for non-Hodgkin's lymphoma (3600 centigrade), we will plan to do MRI of the breast once a year and mammograms likely every 2 years.  She was agreeable to this plan.  - She does have some SOB and would like a Pulmonology referral which we will place today with Dr. Willis. She is already followed by Dr. Figueredo of Cardiology.  - She does have a good understanding of her possible complications from large dose radiation in the past. She should have an annual lipid panel.  - RTC in 1 year with [facility] after breast MRI.        5/2/2024:  - Patient presents for annual follow-up  - There is no evidence of breast cancer recurrence today on clinical exam.   - She had routine labs done in December 2023, we will hold off on any additional blood work at this time  - Before proceeding with another breast MRI patient would like me to discuss the risks versus benefits with Dr. Churchill since patient had outside testing done showing elevated MRI dye/gadolinium in her bloodstream  - Patient also due for routine right breast screening mammogram  - Patient reports  feeling great and has no complaints or concerns at this time  - Patient would like to go ahead and schedule a follow-up visit with Dr. Thompson for ongoing cardiac care in the setting of past radiation  -Patient will return to clinic in 1 year but we will talk in the meantime to review testing which I will order once I discussed with Dr. Churchill    Addendum 5/10/2024: Called patient to discuss that I did speak with Dr. Churchill and updated her on patient's gadolinium levels.  Dr. Churcihll stated that even despite patient's gadolinium levels she does not recommend ongoing breast MRIs due to length of time patient is from her initial diagnosis and in addition to this I also consulted with a colleague from our breast surgery team who reviewed patient's history and past testing.  That provider also stated that she does not see an indication for patient to continue with breast MRIs.  I left this information on patient's voicemail (called 2x) and stated that I will order her routine screening right breast mammogram in addition to right axillary ultrasound if indicated due to soft tissue area protruding while patient is laying flat with arm outstretched above her, I do feel as though this area is benign but ultrasound entered in case it is needed.  I will call patient with her results once I receive them.  In addition Dr. Greenwood stated there is no additional information to share regarding patient's past radiation exposure other than routine follow-up care that I reviewed with the patient during her visit.    5/2/2025:  - Presents for annual visit   - Breast imaging last done in July 2024, she is inquiring if she is due for MRI this year, reviewed above. Discussed if she'd like a breast MRI I am happy to order it due to her hx and right breast with some dense tissue throughout, and we can see if insurance covers and alternate with MRI/mammogram each year but there are no certain indications for her to need the MRI   - We also  discussed role for fast MRI 6 months after annual mammogram   - Encouraged her to discuss her previous elevated serum gadolinium levels with the provider who ordered the lab test to learn if any risks of further MRI's as we do not monitor that level   - She decided she'd like to have her yearly mammogram and will consider scheduling fast breast MRI 6 months later, handout provided   - She'd like a referral to acupuncture for neuropathy and sleep issues  - Oxygen saturation on arrival was 91%, rechecked and 94%. She does not wear supplemental oxygen and respiratory exam today is normal, she denies symptoms. Discussed we can check her lab work and a CXR today but she'd like to only monitor her oxygen saturation at home over the weekend and will notify me or her PCP if oxygen saturation is decreasing or if she develops new symptoms   - Says she had labs in April, discussed I could not find those results but she reports they had no concerning findings   - Discussed that she can follow up with PCP for breast exams/mammograms but she'd like to return in 1 year for annual visit       Diagnoses and all orders for this visit:  Chemotherapy-induced peripheral neuropathy (Multi)  -     Referral to [facility]; Future  -     Clinic Appointment Request Follow up; JAMIL STEWART; Future  -     BI mammo right screening tomosynthesis; Future  Malignant neoplasm of left breast in female, estrogen receptor positive, unspecified site of breast  -     Clinic Appointment Request Follow up; [facility] STEWART  -     Referral to [facility]; Future  Encounter for screening mammogram for malignant neoplasm of breast  -     BI mammo right screening tomosynthesis; Future        HARLEY Sam-CNP

## 2025-05-02 ENCOUNTER — OFFICE VISIT (OUTPATIENT)
Dept: HEMATOLOGY/ONCOLOGY | Facility: CLINIC | Age: 80
End: 2025-05-02
Payer: MEDICARE

## 2025-05-02 VITALS
OXYGEN SATURATION: 94 % | TEMPERATURE: 97 F | DIASTOLIC BLOOD PRESSURE: 57 MMHG | BODY MASS INDEX: 20.55 KG/M2 | RESPIRATION RATE: 16 BRPM | SYSTOLIC BLOOD PRESSURE: 107 MMHG | HEART RATE: 70 BPM | WEIGHT: 119.71 LBS

## 2025-05-02 DIAGNOSIS — C50.912 MALIGNANT NEOPLASM OF LEFT BREAST IN FEMALE, ESTROGEN RECEPTOR POSITIVE, UNSPECIFIED SITE OF BREAST: ICD-10-CM

## 2025-05-02 DIAGNOSIS — Z12.31 ENCOUNTER FOR SCREENING MAMMOGRAM FOR MALIGNANT NEOPLASM OF BREAST: ICD-10-CM

## 2025-05-02 DIAGNOSIS — G62.0 CHEMOTHERAPY-INDUCED PERIPHERAL NEUROPATHY (MULTI): Primary | ICD-10-CM

## 2025-05-02 DIAGNOSIS — Z17.0 MALIGNANT NEOPLASM OF LEFT BREAST IN FEMALE, ESTROGEN RECEPTOR POSITIVE, UNSPECIFIED SITE OF BREAST: ICD-10-CM

## 2025-05-02 DIAGNOSIS — T45.1X5A CHEMOTHERAPY-INDUCED PERIPHERAL NEUROPATHY (MULTI): Primary | ICD-10-CM

## 2025-05-02 PROCEDURE — 3074F SYST BP LT 130 MM HG: CPT

## 2025-05-02 PROCEDURE — 1126F AMNT PAIN NOTED NONE PRSNT: CPT

## 2025-05-02 PROCEDURE — 99214 OFFICE O/P EST MOD 30 MIN: CPT

## 2025-05-02 PROCEDURE — 1123F ACP DISCUSS/DSCN MKR DOCD: CPT

## 2025-05-02 PROCEDURE — 1159F MED LIST DOCD IN RCRD: CPT

## 2025-05-02 PROCEDURE — 3078F DIAST BP <80 MM HG: CPT

## 2025-05-02 ASSESSMENT — PAIN SCALES - GENERAL: PAINLEVEL_OUTOF10: 0-NO PAIN

## 2025-05-23 ENCOUNTER — CLINICAL SUPPORT (OUTPATIENT)
Dept: AUDIOLOGY | Facility: CLINIC | Age: 80
End: 2025-05-23
Payer: MEDICARE

## 2025-05-23 ENCOUNTER — HOSPITAL ENCOUNTER (OUTPATIENT)
Dept: CARDIOLOGY | Facility: CLINIC | Age: 80
Discharge: HOME | End: 2025-05-23
Payer: MEDICARE

## 2025-05-23 DIAGNOSIS — H93.13 TINNITUS OF BOTH EARS: ICD-10-CM

## 2025-05-23 DIAGNOSIS — Z95.0 PACEMAKER: Primary | ICD-10-CM

## 2025-05-23 DIAGNOSIS — I44.2 CHB (COMPLETE HEART BLOCK): ICD-10-CM

## 2025-05-23 DIAGNOSIS — H90.3 SENSORINEURAL HEARING LOSS (SNHL) OF BOTH EARS: Primary | ICD-10-CM

## 2025-05-23 DIAGNOSIS — R55 SYNCOPE AND COLLAPSE: ICD-10-CM

## 2025-05-23 PROCEDURE — 93280 PM DEVICE PROGR EVAL DUAL: CPT | Performed by: NURSE PRACTITIONER

## 2025-05-23 PROCEDURE — 92557 COMPREHENSIVE HEARING TEST: CPT

## 2025-05-23 PROCEDURE — 93280 PM DEVICE PROGR EVAL DUAL: CPT

## 2025-05-23 PROCEDURE — 92550 TYMPANOMETRY & REFLEX THRESH: CPT | Mod: 52

## 2025-05-23 ASSESSMENT — PAIN - FUNCTIONAL ASSESSMENT: PAIN_FUNCTIONAL_ASSESSMENT: 0-10

## 2025-05-23 ASSESSMENT — PAIN SCALES - GENERAL: PAINLEVEL_OUTOF10: 0 - NO PAIN

## 2025-05-23 NOTE — PROGRESS NOTES
AUDIOLOGIC EVALUATION  Name: Harmeet Conner  YOB: 1945  MRN: [id number]  Age: 79 y.o.    Date of Evaluation:  5/23/2025     History:  Harmeet Conner, 79 y.o., was seen today for a hearing evaluation on order from Lilliana Salinas DO.The patient reported that she has two sets of hearing aids that she cannot keep in her ears. She obtained her first pair from [employer] 1.5 years ago. This is a SAIMA style, and reported that the issue is related to her also wearing glasses. She has a pair of Rexton ITEs from [employer] that also do not stay in her ears. She reported bilateral hearing loss in both ears for several years. She denied having a better hearing ear. She reported tinnitus in both ears. She has had otalgia in the left ear only for 3 nights in a row. She has a history of noise exposure. She indicated some dizziness today, however she believes it is related to her pacemaker. She denied current otalgia and previous otologic surgery.    Evaluation:    Otoscopy  Clear canals bilaterally    Tympanometry  Right ear: Type A, normal ear canal volume and compliance.  Left ear: Type A, normal ear canal volume and compliance.     Acoustic Reflexes  Right ear: Ipsilateral acoustic reflexes present at 500 - 4000 Hz  Left ear: Ipsilateral acoustic reflex present at 500 - 2000 Hz (no response at 4000 Hz)    Audiometric Evaluation  Right ear: normal hearing through 1000 Hz sloping to a moderate sensorineural hearing loss. Word recognition ability estimated to be excellent(100%) at 60 dB HL based on an NU-6 recorded ordered by difficulty 10-word list.  Left ear: normal hearing through 500 Hz sloping to a moderate sensorineural hearing loss. Word recognition ability estimated to be excellent(96%) at 65 dB HL based on an NU-6 recorded 25-word list.    Binaural QuickSIN testing was completed at 70 dB HL. Two lists were averaged for best estimation of performance in noise. Results indicate an SNR loss of 4 dB. This degree of SNR  loss is in the mild range. Performance today indicates that the patient may hear almost as well as normals in noise with use of directional microphone technology.    The test results were discussed with the patient.     Impressions  Today's evaluation revealed normal hearing sloping to a moderate sensorineural hearing loss in both ears. Word recognition abilities were measured to be excellent bilaterally. Tympanograms were type A (normal) in both ears.    Patient brought both pairs of her hearing aids to the clinic. She prefers the sound quality of her [employer] hearing aids. She was able to insert these without issue, however it appears that the  wire needs to be lengthened for improved fit. Patient has retention lines on both devices. It was recommended that these be lengthened as well. Patient was encouraged to consider the use of hearing aid shirt clips, which can be found online. Patient will return to [employer] and request these changes. She was encouraged to contact our office with any questions or concerns.     All patient questions were answered.     Recommendations  - Continue medical follow-up with established providers   - Re-test hearing annually  - Continue use of binaural amplification  - Return to hearing aid provider for further physical fit adjustments as needed    Time: 8938-4278    SCOTT Abdullahi, CCC-A  Licensed Audiologist    No images are attached to the encounter or orders placed in the encounter.

## 2025-05-28 NOTE — PROGRESS NOTES
"Patient ID: Harmeet Conner is a 79 y.o. female.  Referring Physician: No referring provider defined for this encounter.  Primary Care Provider: Lilliana Salinas DO    Harmeet Conner is an 79 year old female with PMH of non-Hodgkin's lymphoma (1961 s/p radiation), left breast CA - lobular carcinoma (2000) s/p MRM, chemo, adjuvant therapy, chronic low back pain, HLD, AV block who presents today for an integrative oncology office visit.       CANCER HISTORY:   No ref. provider found   For:  [Associated problem not found]   Integrative Oncology History:  1. 1961: NHL s/p 3600 centigray of radiation (cobalt) to the left chest during a 3 week period at the age of 15 years.  2. March 2000: Left breast: 3 cm invasive lobular carcinoma, %, KS 95%, HER2 2+, 1/6 LN+ (questionable focus in second LN), s/p MRM.  3. May 2000: AC x 4 followed by Taxol x 4 q 3 weeks adjuvant.  4. November 2000: Tamoxifen 20mg daily.  5. August 2005 - July 2010: Femara 2.5mg daily.  6. 2013 -23andme testing and was found to be negative for any significant genetic markers      Somatic complaints today:  Ongoing neuropathy in B/L feet - since CA treatment 25 years ago   Increased discoloration - left more than right foot, ongoing for 1 year   Numbness in B/L heels     Inquiring about short-term/working memory. Feeling difficult to recall, feels thinking is not \"crisp and precise\" as it used to be. Working with [facility]     Nutrition:  Dietary Habits:   Breakfast: yogurt, berries, zucchini, grass-fed butter   Lunch: leftovers   Dinner:  hamburger   Snack(s): rarely   Soda and sugar: rarely will have diet soda    Eating out: 1x/week   Red meat: occasionally, 1x/week. Focuses on grass-fed      Food Intake:   Food Intolerances/Allergies: shellfish/seafood   Dietary restrictions or preferences: \"picky eater\" - [place] sprouts     Caffeine: no coffee, rarely will have caffeinated teas   Water: 16 oz/day      Physical Activity:  Current " "Activity Level: moderate. Biking daily, gardening/outdoors   Barriers to Activity: energy levels, \"laziness\"   Goals: none     Restorative Sleep:  Sleep Duration and Quality: difficulty initiating and staying asleep - will use biofeedback when necessary. Varies 3-9 hours/night   Sleep Environment: bed   Sleep Hygiene: reading, aiming to sleep around the same time each night      Stress Management:  Stress Level: 2/10  Stress Management Techniques: none at this time   Stress Goals: none      Substance Use:  Tobacco Use: denies past or current   Alcohol Use: occasionally will have a sip of a glass 1-2x/month   Other Substance Use: denies past or current      Social Connections:  Social Support: close friends   Social Activities: going out to eat, gardening, playing with dog        Patient's Goals:   Learning more about integrative oncology -- hoping for it to grow   Addressing neuropathy        Supplements:   Vitamin D3 10,000 units weekly  Calcium carbonate 500 mg   Magnesium gluconate   Vitamin B complex   Multivitamin   Lithium orotate       ROS:  no ha, visual symptoms, hearing loss  no sob, chest pain, palp  ROS o/w non contributory, please see HPI    Objective    BSA: There is no height or weight on file to calculate BSA.  There were no vitals taken for this visit.    PHYSICAL EXAM:  NAD, awake/alert  HEENT, NCAT, OP clear, no oral lesions  CTA bilat  RRR no mgr  Abd soft/nt/nd+bs  No c/c/e/ttp  Motor/sensory intact, CN 2-12 intact     RESULTS:  Lab Results   Component Value Date    WBC 5.5 10/23/2024    HGB 13.2 10/23/2024    HCT 40.4 10/23/2024     10/23/2024    CHOL 214 (H) 12/27/2022    TRIG 95 12/27/2022    HDL 66.2 12/27/2022    ALT 15 10/23/2024    AST 19 10/23/2024     10/23/2024    K 4.2 10/23/2024     10/23/2024    CREATININE 0.76 10/23/2024    BUN 23 10/23/2024    CO2 32 10/23/2024    TSH 2.08 10/23/2024    HGBA1C 5.5 10/23/2024     Lab Results   Component Value Date    IAHTLNTY86 " "891 10/23/2024     Labs 10/23/24:  Random insulin: 3  Homocysteine: 9.08   Folate 23.3    CXR 12/23/24:  Lines, tubes, and devices:  2-lead pacer is noted.     Lungs and pleura:  Triangular opacity in the left apical lung likely   related to scarring with volume loss of the left hemithorax.  No effusion   or pneumothorax is seen     Cardiomediastinal silhouette:  Normal cardiomediastinal silhouette.       Assessment/Plan   Cancer Staging   No matching staging information was found for the patient.      SYMPTOMS Experienced:  Neuropathy   Impaired memory recall     Plan:  Recommend acupuncture - group or individual - weekly for at least 4-8 weeks   I will email referral to [facility] and their team will reach out to you for scheduling   Follow up in 3-4 months (can call the office at [phone] to schedule)    Diet:  General Guidelines:  The goal of an anti-inflammatory diet is to increase more nutrient dense, whole foods with reduction in processed/packaged foods. It is also important to reduce alcohol, juice, soda, and other sugar-sweetened beverages. Keep in mind, food changes occur through small steps to have a lasting success.     Recommend 25 to 30 grams fiber per day, with 6 to 8 grams from soluble fiber.  - Include a wide variety of vegetables, beans, lentils, berries, talia seeds, and flax seeds.  - Soluble fiber sources: black beans, kidney beans, [place] sprouts, sweet potatoes, broccoli, turnips, carrots, avocado, pears, apricots, nectarines, apples, flax seeds, sunflower seeds, hazelnuts, oats.     2. Recommend 5-9 servings of vegetables and 1-2 servings of fruits per day. Incorporate the idea of \"Eat The [place]\" when it comes to fruits and vegetables - these ingredients include phytonutrients that are essential to supporting a healthy gut microbiome, fiber to support healthy blood sugar and inflammation balance, and can supplement key vitamins and minerals to our diet.    3. Recommend at least " 30 grams of protein with each meal.   Protein shakes to consider:  - Orgain protein powder  - OWYN protein shakes    4. Add in fermented foods: raw sauerkraut, raw brined pickles, yogurt, kefir, kombucha (low sugar)     5. Drink plenty of water and anti-inflammatory herbal teas (turmeric, patsy, peppermint, chamomile). Recommend omega 3 either through food (SMASH fish, talia seed, ground flax seed, walnuts) or supplement.      -----------------------------------  Gut Health and Nutrition Tips:   1.Reduce exposure to gut disruptors:   Processed/packaged foods high in refined carbohydrates, artificial sweeteners, processed oils, thickeners, emulsifiers, sodium, artificial dyes, etc.   Too much caffeine, alcohol, and sugar  Stress and poor sleep quality   Sedentary lifestyle   NSAIDs (Ibuprofen, Advil, Aleve)   Frequent antibiotic use     2.Reduce intake of inflammatory oils high in omega-6 fatty acids:   Corn, sunflower, safflower, soybean, canola, grapeseed  Preferred oils are olive or avocado    3. Reduce intake of inflammatory food groups:  Dairy, gluten, processed meat, refined carbohydrates, fried foods, and sugary foods and drinks -- these are highly inflammatory, and processed meat can act as a group 1a carcinogen    -------------------------------------  How to Incorporate Diet Changes:  Consider using pre-made meals, delivered to your home to offer more variety in your diet.  Recommended companies: Un[employer]ined KENNETH, Factor, Trifecta, [employer] 6sicuro.it, [employer], [employer], [employer].  All of these options offer vegetarian/vegan meal options as well.    Favorite websites for healthy recipes:  [place]’s Pantry  Nom Nom Paleo  Real Simple Good  Paleo Running Maurice Fagan Thornton  Cooking Matters  Budget Bytes  [place] Over Knives  Nourishing Meals  Against All Grains  Eating Bird Food    3. Refer to handouts regarding Food As Medicine diet plan. This includes recipes and grocery lists.       Integrative  Therapies:  Acupuncture ($42/session group acupuncture rate)  Massage   REIKI (Free)    For Further Information:  Reading: Anticancer Living, Cancer Fighting Kitchen  Websites: Cancerchoices.org, Anticancer Lifestyle Program  Yu: ONCIO  Podcast: Integrative Oncology Talk      Support: [employer] (exercise programs, dietitian, support groups, financial support and services)  [employer] for HER - passport for integrative services including vegetables/virtual wellness      Follow up: Integrative Oncology 3-4 months    Thank you for consulting Integrative Oncology  I spent 60 minutes in direct patient care during this clinical encounter with the patient

## 2025-06-03 ENCOUNTER — APPOINTMENT (OUTPATIENT)
Dept: CARDIOLOGY | Facility: CLINIC | Age: 80
End: 2025-06-03
Payer: MEDICARE

## 2025-06-03 VITALS
HEART RATE: 81 BPM | BODY MASS INDEX: 20.25 KG/M2 | DIASTOLIC BLOOD PRESSURE: 50 MMHG | HEIGHT: 64 IN | WEIGHT: 118.6 LBS | SYSTOLIC BLOOD PRESSURE: 108 MMHG

## 2025-06-03 DIAGNOSIS — I10 BENIGN ESSENTIAL HTN: ICD-10-CM

## 2025-06-03 DIAGNOSIS — R25.1 TREMOR: ICD-10-CM

## 2025-06-03 DIAGNOSIS — Z95.0 PACEMAKER: ICD-10-CM

## 2025-06-03 DIAGNOSIS — R42 EPISODIC LIGHTHEADEDNESS: Primary | ICD-10-CM

## 2025-06-03 DIAGNOSIS — I35.8 AORTIC VALVE SCLEROSIS: ICD-10-CM

## 2025-06-03 DIAGNOSIS — R00.2 PALPITATIONS: ICD-10-CM

## 2025-06-03 PROCEDURE — 1159F MED LIST DOCD IN RCRD: CPT | Performed by: INTERNAL MEDICINE

## 2025-06-03 PROCEDURE — 3078F DIAST BP <80 MM HG: CPT | Performed by: INTERNAL MEDICINE

## 2025-06-03 PROCEDURE — 1160F RVW MEDS BY RX/DR IN RCRD: CPT | Performed by: INTERNAL MEDICINE

## 2025-06-03 PROCEDURE — 3074F SYST BP LT 130 MM HG: CPT | Performed by: INTERNAL MEDICINE

## 2025-06-03 PROCEDURE — 99214 OFFICE O/P EST MOD 30 MIN: CPT | Performed by: INTERNAL MEDICINE

## 2025-06-03 PROCEDURE — 1036F TOBACCO NON-USER: CPT | Performed by: INTERNAL MEDICINE

## 2025-06-03 PROCEDURE — G2211 COMPLEX E/M VISIT ADD ON: HCPCS | Performed by: INTERNAL MEDICINE

## 2025-06-03 RX ORDER — PROPRANOLOL HYDROCHLORIDE 40 MG/1
40 TABLET ORAL DAILY
Qty: 90 TABLET | Refills: 3 | Status: SHIPPED | OUTPATIENT
Start: 2025-06-03 | End: 2026-06-03

## 2025-06-03 NOTE — PATIENT INSTRUCTIONS
Decrease propranolol LA to once daily.   If you want to wean this off would take it once daily for 2 months, then take it every other day for 2 months, then every 3rd day for a month then stop.     Monitor blood pressure readings once you stop the medication. Call if BP > 140/90    Get labs

## 2025-06-03 NOTE — PROGRESS NOTES
Patient:  Harmeet Conner  YOB: 1945  MRN: [id number]       Chief Complaint/Active Symptoms:       Harmeet Conner is a 79 y.o. female who returns today for cardiac follow-up of aortic valve sclerosis, HTN, hx of breast cancer and NH lymphoma. Has history of pacemaker.     Doing well without any patterns. Started a nitric oxide donor at the recommendation of a naturopath and feels her sleep has improved. Has noted BP has been lower while taking this and has had some dizziness. Sometimes the dizziness lasts all day long. Hydration is not at 1 oz/kg. Does about 30 oz a day. These symptoms worse since nitric oxide donor (vasodilator). No chest pain or discomfort. No shortness of breath, orthopnea or PND.     [facility]: [facility], Acx4, taxol x 4 q3 weeks in May 2000, tamoxifen since 11/2000, [id number] on Femara     Cancer Diagnosis: Non-Hodgkins lymphoma - age 15 - chest radiation 3600 cg to left chest over 3 weeks.     Review of Systems    Objective:     Vitals:    06/03/25 1135   BP: 108/50   Pulse: 81       Vitals:    06/03/25 1135   Weight: 53.8 kg (118 lb 9.6 oz)       Allergies:     Allergies[1]       Medications:     Current Outpatient Medications   Medication Instructions    alendronate (Fosamax) 70 mg tablet once per week in am, 8oz water, empty stomach, nothing by mouth and do not lie down for the next 30 minutes.    aspirin 81 mg, 2 times weekly    calcium carbonate (Oscal) 500 mg calcium (1,250 mg) tablet Take by mouth.    cholecalciferol, vitamin D3, 250 mcg (10,000 unit) tablet Take by mouth.    esomeprazole (NexIUM) 40 mg DR capsule TAKE ONE CAPSULE BY MOUTH EVERY MORNING. TAKE BEFORE MEALS. DO NOT OPEN CAPSULE    magnesium gluconate, bulk, powder Transdermal.    propranolol (INDERAL) 40 mg, oral, Daily    trospium (SANCTURA) 20 mg, oral, 2 times daily    valACYclovir (Valtrex) 500 mg tablet 1 tablet, 2 times daily    VITAMIN B COMPLEX  "ORAL 1 capsule, Daily RT       Physical Examination:   GENERAL:  Well developed, well nourished, in no acute distress.  HEENT: NC AT, EOMI with anicteric sclera  NECK:   no JVD, no bruit.  CHEST:  Symmetric and nontender.  LUNGS:  Clear to auscultation bilaterally, normal respiratory effort.  HEART:  PMI is nondisplaced. RR, occ irreg with normal S1 and S2, no S3. II/VI DIMPLE RUSB, no diastolic murmur heard. No edema. Preserved distal pulses.   ABDOMEN: Soft, NT, ND without palpable organomegaly or bruits  EXTREMITIES:  Warm with good color, no clubbing or cyanosis.  There is no edema noted.  PERIPHERAL VASCULAR:  Pulses present and equally palpable; 1+ pedals  MUSCULOSKELETAL: sarcopenia, OA changes.   NEURO/PSYCH:  Alert and oriented times three with approppriate behavior and responses. Nonfocal motor examination with normal gait and ambulation  Lymph: No significant palpable lymphadenopathy  Skin: no rash or lesions on exposed skin or reported. Decreased turgor    Lab:     CBC:   Lab Results   Component Value Date    WBC 5.5 10/23/2024    RBC 3.80 (L) 10/23/2024    HGB 13.2 10/23/2024    HCT 40.4 10/23/2024     10/23/2024        CMP:    Lab Results   Component Value Date     10/23/2024    K 4.2 10/23/2024     10/23/2024    CO2 32 10/23/2024    BUN 23 10/23/2024    CREATININE 0.76 10/23/2024    GLUCOSE 91 10/23/2024    CALCIUM 9.1 10/23/2024       Magnesium:    No results found for: \"MG\"    Lipid Profile:    Lab Results   Component Value Date    TRIG 95 12/27/2022    HDL 66.2 12/27/2022       TSH:    Lab Results   Component Value Date    TSH 2.08 10/23/2024       BNP:   No results found for: \"BNP\"     PT/INR:    No results found for: \"PROTIME\", \"INR\"    HgBA1c:    Lab Results   Component Value Date    HGBA1C 5.5 10/23/2024       BMP:  Lab Results   Component Value Date     10/23/2024     12/01/2023     09/27/2021     09/09/2019    K 4.2 10/23/2024    K 4.1 12/01/2023    K " "4.0 09/27/2021    K 4.0 09/09/2019     10/23/2024     12/01/2023     09/27/2021     09/09/2019    CO2 32 10/23/2024    CO2 29 12/01/2023    CO2 26 09/27/2021    CO2 28 09/09/2019    BUN 23 10/23/2024    BUN 15 12/01/2023    BUN 18 09/27/2021    BUN 18 09/09/2019    CREATININE 0.76 10/23/2024    CREATININE 0.72 12/01/2023    CREATININE 0.91 09/27/2021    CREATININE 0.72 09/09/2019       Cardiac Enzymes:    No results found for: \"TROPHS\"    Hepatic Function Panel:    Lab Results   Component Value Date    ALKPHOS 41 10/23/2024    ALT 15 10/23/2024    AST 19 10/23/2024    PROT 6.2 (L) 10/23/2024    BILITOT 0.6 10/23/2024     Labs reviewed. Last LDL cholesterol 12/2022 and 129    Diagnostic Studies:   Prior CV testing reviewed as follows:  No echocardiogram results found for the past 12 months  Echo 4/2023:  CONCLUSIONS:  1. Left ventricular systolic function is normal with a 55-60% estimated ejection fraction.  2. Intact intraventricular septum without shunting or a ventricular septal defect.  3. No left ventricular thrombus visualized.  4. There is no evidence of left ventricular hypertrophy.  5. The mitral valve is moderately thickened.  6. There is moderate mitral annular calcification.  7. Mild to moderate mitral valve regurgitation.  8. The tricuspid valve is abnormal.  9. There is moderate thickening of the tricuspid valve leaflets.  10. Moderate tricuspid regurgitation.  11. RVSP within normal limits.  12. Aortic valve sclerosis.  13. Mild aortic valve regurgitation.    No nuclear medicine results found for the past 12 months  Nuclear stress 6/2021:  IMPRESSION:  Normal Lexiscan Myoview cardiac perfusion stress test.  No evidence of ischemia or myocardial infarction by perfusion imaging.  Normal left ventricular systolic function, ejection fraction 75%.  There are no prior studies available for comparison.  No valid procedures specified.    EKG:   EKG from 12/23/24 - V paced    Radiology: "     No orders to display     ASSESSMENT     Problem List Items Addressed This Visit       Aortic valve sclerosis    Relevant Medications    propranolol (Inderal) 40 mg tablet    Other Relevant Orders    Basic Metabolic Panel    Magnesium    CBC and Auto Differential    TSH with reflex to Free T4 if abnormal    Benign essential HTN    Episodic lightheadedness - Primary    Relevant Orders    Basic Metabolic Panel    Magnesium    CBC and Auto Differential    TSH with reflex to Free T4 if abnormal    Pacemaker     Other Visit Diagnoses         Tremor        Progression from resting to both resting an intention tremor. Need to consider Parkinson's. Refer to Neurology.    Relevant Medications    propranolol (Inderal) 40 mg tablet      Palpitations        Relevant Orders    TSH with reflex to Free T4 if abnormal            PLAN   1.  Aortic valve sclerosis.  Murmur is consistent with the same.  Consider repeat echocardiogram in the next 2 or 3 years.  If she develops more symptoms we will perform it sooner.  2.  History of tremor.  She does not feel the propranolol is helping her tremor and with her blood pressures being lower now she would like to see if it can be stopped.  Will decrease her propranolol to once daily and then we have given her some some instructions how to slowly wean it off as she has been taking it for long period of time.  3.  Episodic lightheadedness.  This mostly suggestion of orthostasis to it I suspect given her volume that she drinks that that may be possibly be triggering the sudden events.  She does have a history of neurocardiogenic syncope and her pacer interrogation did note that she had some increased rate drop episodes.  She is scheduled to see EP in the next month so we will see what increased hydration and backing off on her beta-blockers does.  4.  Pacemaker.  Her EKG was 100% paced.  Please see the above we will follow-up with electrophysiology.  5.  Hypertension.  Her blood pressures  actually are on the low side.  Will wean off her beta-blockers and see what her blood pressure is off of these medications.  Taking the nitric oxide donor may be causing an too much Vino dilatation and can be contributing to her lightheadedness especially with an adequate hydration.  If her blood pressures are elevated off the medication we will carefully choose what she may benefit from.    Will see the patient in follow-up in a year will see her sooner if she notices a blood pressure elevation.  She is scheduled to see electrophysiology next month with a pacemaker check and will defer any other changes to them.                     [1]   Allergies  Allergen Reactions    Iodinated Contrast Media Unknown    Iodine Shortness of breath and Nausea/vomiting     Body turns read    Benztropine Confusion    Gabapentin Confusion    Nystatin Diarrhea     Tablets only, swish and swallow solution is tolerated    Paroxetine Hcl Other     Dystonia    Prochlorperazine Other     dystonia

## 2025-06-06 PROBLEM — I44.2 COMPLETE ATRIOVENTRICULAR BLOCK (MULTI): Status: RESOLVED | Noted: 2021-06-09 | Resolved: 2025-06-06

## 2025-06-06 PROBLEM — R00.0 WIDE-COMPLEX TACHYCARDIA: Status: RESOLVED | Noted: 2018-10-12 | Resolved: 2025-06-06

## 2025-06-06 PROBLEM — I45.5 SINUS PAUSE: Status: RESOLVED | Noted: 2018-10-12 | Resolved: 2025-06-06

## 2025-06-06 PROBLEM — Z86.39 HISTORY OF ELEVATED LIPIDS: Status: ACTIVE | Noted: 2025-06-06

## 2025-06-06 PROBLEM — I44.7 LBBB (LEFT BUNDLE BRANCH BLOCK): Status: RESOLVED | Noted: 2023-12-04 | Resolved: 2025-06-06

## 2025-06-06 PROBLEM — E04.9 GOITER: Status: RESOLVED | Noted: 2018-07-09 | Resolved: 2025-06-06

## 2025-06-06 PROBLEM — I44.30 PARTIAL AV BLOCK: Status: RESOLVED | Noted: 2023-12-04 | Resolved: 2025-06-06

## 2025-07-01 ENCOUNTER — APPOINTMENT (OUTPATIENT)
Dept: CARDIOLOGY | Facility: CLINIC | Age: 80
End: 2025-07-01
Payer: MEDICARE

## 2025-07-01 VITALS
SYSTOLIC BLOOD PRESSURE: 130 MMHG | DIASTOLIC BLOOD PRESSURE: 60 MMHG | HEART RATE: 71 BPM | WEIGHT: 119 LBS | OXYGEN SATURATION: 97 % | BODY MASS INDEX: 20.43 KG/M2

## 2025-07-01 DIAGNOSIS — R00.2 PALPITATIONS: ICD-10-CM

## 2025-07-01 PROBLEM — R05.3 POST-COVID CHRONIC COUGH: Status: RESOLVED | Noted: 2023-12-04 | Resolved: 2025-07-01

## 2025-07-01 PROBLEM — R41.89 COGNITIVE CHANGE: Status: RESOLVED | Noted: 2019-09-24 | Resolved: 2025-07-01

## 2025-07-01 PROBLEM — R55 SYNCOPE: Status: RESOLVED | Noted: 2018-10-11 | Resolved: 2025-07-01

## 2025-07-01 PROBLEM — R05.9 COUGH: Status: RESOLVED | Noted: 2023-12-04 | Resolved: 2025-07-01

## 2025-07-01 PROBLEM — U09.9 POST-COVID CHRONIC COUGH: Status: RESOLVED | Noted: 2023-12-04 | Resolved: 2025-07-01

## 2025-07-01 PROBLEM — R55 NEAR SYNCOPE: Status: RESOLVED | Noted: 2023-12-04 | Resolved: 2025-07-01

## 2025-07-01 PROCEDURE — 93005 ELECTROCARDIOGRAM TRACING: CPT | Performed by: STUDENT IN AN ORGANIZED HEALTH CARE EDUCATION/TRAINING PROGRAM

## 2025-07-01 PROCEDURE — 1159F MED LIST DOCD IN RCRD: CPT | Performed by: STUDENT IN AN ORGANIZED HEALTH CARE EDUCATION/TRAINING PROGRAM

## 2025-07-01 PROCEDURE — G2211 COMPLEX E/M VISIT ADD ON: HCPCS | Performed by: STUDENT IN AN ORGANIZED HEALTH CARE EDUCATION/TRAINING PROGRAM

## 2025-07-01 PROCEDURE — 93010 ELECTROCARDIOGRAM REPORT: CPT | Performed by: STUDENT IN AN ORGANIZED HEALTH CARE EDUCATION/TRAINING PROGRAM

## 2025-07-01 PROCEDURE — 99204 OFFICE O/P NEW MOD 45 MIN: CPT | Performed by: STUDENT IN AN ORGANIZED HEALTH CARE EDUCATION/TRAINING PROGRAM

## 2025-07-01 PROCEDURE — 3078F DIAST BP <80 MM HG: CPT | Performed by: STUDENT IN AN ORGANIZED HEALTH CARE EDUCATION/TRAINING PROGRAM

## 2025-07-01 PROCEDURE — 3075F SYST BP GE 130 - 139MM HG: CPT | Performed by: STUDENT IN AN ORGANIZED HEALTH CARE EDUCATION/TRAINING PROGRAM

## 2025-07-01 PROCEDURE — 99202 OFFICE O/P NEW SF 15 MIN: CPT | Mod: 25

## 2025-07-01 NOTE — PROGRESS NOTES
Cardiac Electrophysiology Office Visit     Referred by Doug Fletcher MD for   Chief Complaint   Patient presents with    Annual Exam     HPI:  Harmeet Conner is a 79 y.o. year old female patient with h/o HTN, Breast CA s/p Left (Mastectomy), NH lymphoma, CHB/Syncope s/p dcPP< (2018) presenting today to establish care    Objective  Current Outpatient Medications   Medication Instructions    alendronate (Fosamax) 70 mg tablet once per week in am, 8oz water, empty stomach, nothing by mouth and do not lie down for the next 30 minutes.    aspirin 81 mg, 2 times weekly    calcium carbonate (Oscal) 500 mg calcium (1,250 mg) tablet Take by mouth.    cholecalciferol, vitamin D3, 250 mcg (10,000 unit) tablet Take by mouth.    esomeprazole (NexIUM) 40 mg DR capsule TAKE ONE CAPSULE BY MOUTH EVERY MORNING. TAKE BEFORE MEALS. DO NOT OPEN CAPSULE    magnesium gluconate, bulk, powder Transdermal.    propranolol (INDERAL) 40 mg, oral, Daily    trospium (SANCTURA) 20 mg, oral, 2 times daily    valACYclovir (Valtrex) 500 mg tablet 1 tablet, 2 times daily    VITAMIN B COMPLEX ORAL 1 capsule, Daily RT         Visit Vitals  /60   Pulse 71   Wt 54 kg (119 lb)   SpO2 97%   BMI 20.43 kg/m²   OB Status Postmenopausal   Smoking Status Never   BSA 1.56 m²      Physical Exam  Constitutional:       Appearance: Normal appearance.   HENT:      Head: Normocephalic.   Eyes:      Pupils: Pupils are equal, round, and reactive to light.   Cardiovascular:      Rate and Rhythm: Normal rate and regular rhythm.      Pulses: Normal pulses.      Heart sounds: Murmur heard.      Systolic murmur is present with a grade of 3/6.      Comments: right sided implant healed well, no ecchymosis, hematoma or drainage noted  Pulmonary:      Effort: Pulmonary effort is normal. No respiratory distress.      Breath sounds: Normal breath sounds.   Skin:     General: Skin is warm and dry.      Capillary Refill: Capillary refill takes less than 2 seconds.    Neurological:      Mental Status: She is alert.         My Interpretation of Reviewed Study(s):  Results  DIAGNOSTIC  Echocardiogram: Normal left ventricular ejection fraction (55-60%), calcified aortic valve, moderately thickened mitral valve with calcification and mild regurgitation, moderate tricuspid regurgitation (04/2023)       Assessment & Plan  Complete Heart block s/p Pacemaker  Patient has a Medtronic Dual chamber pacemaker.  Anticipated battery longevity 5.5 (as of 5/23/25); RA pacing 10.8%, RV pacing 99.9%.; Arrhythmias noted on interrogation: Few episodes in NSVT; Lead parameters stable with steady impedance and thresholds noted: Stable  - Continue to follow with device clinic as scheduled    Non-sustained ventricular tachycardia  Brief episodes of non-sustained ventricular tachycardia from the bottom chamber, not currently concerning.    Hypertension  Hypertension managed with propranolol 40 mg once daily. Dizziness resolved after reducing propranolol and nitric oxide lozenges from twice to once daily.  - Continue propranolol 40 mg once daily.  - Monitor blood pressure regularly.    Return to Clinic: Patient should return to the EP Clinic in 1 year    Doug Mari MD [facility]  Cardiac Electrophysiology  [email or web address]    **Disclaimer: This note was dictated by speech recognition, and every effort has been made to prevent any error in transcription, however minor errors may be present**    This medical note was created with the assistance of artificial intelligence (AI) for documentation purposes. The content has been reviewed and confirmed by the healthcare provider for accuracy and completeness. Patient consented to the use of audio recording and use of AI during their visit.

## 2025-07-02 LAB
ATRIAL RATE: 71 BPM
P AXIS: 94 DEGREES
P OFFSET: 164 MS
P ONSET: 105 MS
PR INTERVAL: 184 MS
Q ONSET: 197 MS
QRS COUNT: 12 BEATS
QRS DURATION: 162 MS
QT INTERVAL: 450 MS
QTC CALCULATION(BAZETT): 489 MS
QTC FREDERICIA: 476 MS
R AXIS: -65 DEGREES
T AXIS: 100 DEGREES
T OFFSET: 422 MS
VENTRICULAR RATE: 71 BPM

## 2025-07-07 ENCOUNTER — HOSPITAL ENCOUNTER (OUTPATIENT)
Dept: RADIOLOGY | Facility: CLINIC | Age: 80
Discharge: HOME | End: 2025-07-07
Payer: MEDICARE

## 2025-07-07 VITALS — HEIGHT: 64 IN | WEIGHT: 119 LBS | BODY MASS INDEX: 20.32 KG/M2

## 2025-07-07 DIAGNOSIS — G62.0 CHEMOTHERAPY-INDUCED PERIPHERAL NEUROPATHY (MULTI): ICD-10-CM

## 2025-07-07 DIAGNOSIS — Z12.31 ENCOUNTER FOR SCREENING MAMMOGRAM FOR MALIGNANT NEOPLASM OF BREAST: ICD-10-CM

## 2025-07-07 DIAGNOSIS — T45.1X5A CHEMOTHERAPY-INDUCED PERIPHERAL NEUROPATHY (MULTI): ICD-10-CM

## 2025-07-07 PROCEDURE — 77063 BREAST TOMOSYNTHESIS BI: CPT | Mod: RIGHT SIDE | Performed by: STUDENT IN AN ORGANIZED HEALTH CARE EDUCATION/TRAINING PROGRAM

## 2025-07-07 PROCEDURE — 77067 SCR MAMMO BI INCL CAD: CPT | Mod: 52,RT

## 2025-07-07 PROCEDURE — 77067 SCR MAMMO BI INCL CAD: CPT | Mod: RIGHT SIDE | Performed by: STUDENT IN AN ORGANIZED HEALTH CARE EDUCATION/TRAINING PROGRAM

## 2025-07-09 ENCOUNTER — APPOINTMENT (OUTPATIENT)
Dept: INTEGRATIVE MEDICINE | Facility: CLINIC | Age: 80
End: 2025-07-09
Payer: MEDICARE

## 2025-07-09 DIAGNOSIS — R41.3 IMPAIRED MEMORY: ICD-10-CM

## 2025-07-09 DIAGNOSIS — G62.9 NEUROPATHY: Primary | ICD-10-CM

## 2025-07-09 PROCEDURE — 99205 OFFICE O/P NEW HI 60 MIN: CPT

## 2025-07-09 NOTE — PATIENT INSTRUCTIONS
"  Plan:  Recommend acupuncture - group or individual - weekly for at least 4-8 weeks   I will email referral to [facility] and their team will reach out to you for scheduling   Follow up in 3-4 months (can call the office at [phone] to schedule)    Diet:  General Guidelines:  The goal of an anti-inflammatory diet is to increase more nutrient dense, whole foods with reduction in processed/packaged foods. It is also important to reduce alcohol, juice, soda, and other sugar-sweetened beverages. Keep in mind, food changes occur through small steps to have a lasting success.     Recommend 25 to 30 grams fiber per day, with 6 to 8 grams from soluble fiber.  - Include a wide variety of vegetables, beans, lentils, berries, talia seeds, and flax seeds.  - Soluble fiber sources: black beans, kidney beans, [place] sprouts, sweet potatoes, broccoli, turnips, carrots, avocado, pears, apricots, nectarines, apples, flax seeds, sunflower seeds, hazelnuts, oats.     2. Recommend 5-9 servings of vegetables and 1-2 servings of fruits per day. Incorporate the idea of \"Eat The [place]\" when it comes to fruits and vegetables - these ingredients include phytonutrients that are essential to supporting a healthy gut microbiome, fiber to support healthy blood sugar and inflammation balance, and can supplement key vitamins and minerals to our diet.    3. Recommend at least 30 grams of protein with each meal.   Protein shakes to consider:  - Orgain protein powder  - OWYN protein shakes    4. Add in fermented foods: raw sauerkraut, raw brined pickles, yogurt, kefir, kombucha (low sugar)     5. Drink plenty of water and anti-inflammatory herbal teas (turmeric, patsy, peppermint, chamomile). Recommend omega 3 either through food (SMASH fish, talia seed, ground flax seed, walnuts) or supplement.      -----------------------------------  Gut Health and Nutrition Tips:   1.Reduce exposure to gut disruptors:   Processed/packaged foods " high in refined carbohydrates, artificial sweeteners, processed oils, thickeners, emulsifiers, sodium, artificial dyes, etc.   Too much caffeine, alcohol, and sugar  Stress and poor sleep quality   Sedentary lifestyle   NSAIDs (Ibuprofen, Advil, Aleve)   Frequent antibiotic use     2.Reduce intake of inflammatory oils high in omega-6 fatty acids:   Corn, sunflower, safflower, soybean, canola, grapeseed  Preferred oils are olive or avocado    3. Reduce intake of inflammatory food groups:  Dairy, gluten, processed meat, refined carbohydrates, fried foods, and sugary foods and drinks -- these are highly inflammatory, and processed meat can act as a group 1a carcinogen    -------------------------------------  How to Incorporate Diet Changes:  Consider using pre-made meals, delivered to your home to offer more variety in your diet.  Recommended companies: x.ai, Factor, Scopely, Pharnext, localstay.com, AlmondNet, Personaling.  All of these options offer vegetarian/vegan meal options as well.    Favorite websites for healthy recipes:  [employer] Satin [employer]  Nom Nom Paleo  Real Simple Good  Paleo Running Mama  Minimalist Thornton  Cooking Matters  Budget Bytes  [place] Over [place]  Nourishing Meals  Against All Grains  Eating Bird Food    3. Refer to handouts regarding Food As Medicine diet plan. This includes recipes and grocery lists.       Integrative Therapies:  Acupuncture ($42/session group acupuncture rate)  Massage   REIKI (Free)    For Further Information:  Reading: Anticancer Living, Cancer Fighting Kitchen  Websites: Cancerchoices.org, Anticancer Lifestyle Program  Yu: ONCIO  Podcast: Integrative Oncology Talk      Support: [employer] Place (exercise programs, dietitian, support groups, financial support and services)  United for HER - passport for integrative services including vegetables/virtual wellness      Follow up: Integrative Oncology 3-4 months

## 2025-07-10 ENCOUNTER — HOSPITAL ENCOUNTER (OUTPATIENT)
Dept: CARDIOLOGY | Facility: HOSPITAL | Age: 80
Discharge: HOME | End: 2025-07-10
Payer: MEDICARE

## 2025-07-10 ENCOUNTER — TELEPHONE (OUTPATIENT)
Dept: PAIN MEDICINE | Facility: CLINIC | Age: 80
End: 2025-07-10
Payer: MEDICARE

## 2025-07-10 DIAGNOSIS — Z95.0 CARDIAC PACEMAKER IN SITU: ICD-10-CM

## 2025-07-10 DIAGNOSIS — I44.2 ATRIOVENTRICULAR BLOCK, COMPLETE (MULTI): ICD-10-CM

## 2025-07-10 PROCEDURE — 93296 REM INTERROG EVL PM/IDS: CPT

## 2025-07-18 ENCOUNTER — APPOINTMENT (OUTPATIENT)
Dept: INTEGRATIVE MEDICINE | Facility: CLINIC | Age: 80
End: 2025-07-18

## 2025-07-18 DIAGNOSIS — G62.9 NEUROPATHY: Primary | ICD-10-CM

## 2025-07-18 PROCEDURE — ACUGR GROUP ACUPUNCTURE

## 2025-07-18 NOTE — PROGRESS NOTES
Group Acupuncture Visit:     Subjective   Patient ID: Harmeet Conner is a 79 y.o. female who presents for Numbness  Neuropathy at bottom of feet, specifically the heel  Occurred several years ago. Most recently has gotten worse        Session Information  Is this acupuncture treatment being billed to the patient's insurance company: No  Visit Type: Follow-up visit  Medical History Reviewed: I have reviewed pertinent medical history in EHR, and no contraindications are present to provide treatment         Review of Systems         Provider reviewed plan for the acupuncture session, precautions and contraindications. Patient/guardian/hospital staff has given consent to treat with full understanding of what to expect during the session. Before acupuncture began, provider explained to the patient to communicate at any time if the procedure was causing discomfort past their tolerance level. Patient agreed to advise acupuncturist. The acupuncturist counseled the patient on the risks of acupuncture treatment including pain, infection, bleeding, and no relief of pain. The patient was positioned comfortably. There was no evidence of infection at the site of needle insertions.    Objective   Physical Exam              Acupuncture Treatment  Patient Position: Seated and reclining  Acupuncture Needling: Yes  Needle Guage: 40 guage /.16/ Red seirin, 36 guage /.20/ Blue seirin  Body Points: With retention  Body Points - Left: Gb37, Sp6  Body Points - Bilateral: Upper scalp sensory, Gb34, Ki1, Ren21  Body Points - Right: Li10, Ki3  Auricular Points: No  Acupuncture Treatment Change: No changes  Electroacupuncture Used: No  Needle Count In: 11  Needle Count Out: 11  Needle Retention Time (min): 25 minutes  Total Face to Face Time (min): 10 minutes              Assessment/Plan

## 2025-07-28 ENCOUNTER — OFFICE VISIT (OUTPATIENT)
Facility: CLINIC | Age: 80
End: 2025-07-28
Payer: MEDICARE

## 2025-07-28 VITALS
BODY MASS INDEX: 19.81 KG/M2 | OXYGEN SATURATION: 99 % | RESPIRATION RATE: 22 BRPM | DIASTOLIC BLOOD PRESSURE: 60 MMHG | HEIGHT: 64 IN | HEART RATE: 78 BPM | WEIGHT: 116 LBS | SYSTOLIC BLOOD PRESSURE: 128 MMHG

## 2025-07-28 DIAGNOSIS — T45.1X5A CHEMOTHERAPY-INDUCED NEUROPATHY (MULTI): Primary | ICD-10-CM

## 2025-07-28 DIAGNOSIS — G62.0 CHEMOTHERAPY-INDUCED NEUROPATHY (MULTI): Primary | ICD-10-CM

## 2025-07-28 PROCEDURE — 3078F DIAST BP <80 MM HG: CPT | Performed by: ANESTHESIOLOGY

## 2025-07-28 PROCEDURE — 1159F MED LIST DOCD IN RCRD: CPT | Performed by: ANESTHESIOLOGY

## 2025-07-28 PROCEDURE — 99214 OFFICE O/P EST MOD 30 MIN: CPT | Performed by: ANESTHESIOLOGY

## 2025-07-28 PROCEDURE — 1125F AMNT PAIN NOTED PAIN PRSNT: CPT | Performed by: ANESTHESIOLOGY

## 2025-07-28 PROCEDURE — 3074F SYST BP LT 130 MM HG: CPT | Performed by: ANESTHESIOLOGY

## 2025-07-28 PROCEDURE — 1036F TOBACCO NON-USER: CPT | Performed by: ANESTHESIOLOGY

## 2025-07-28 PROCEDURE — G2211 COMPLEX E/M VISIT ADD ON: HCPCS | Performed by: ANESTHESIOLOGY

## 2025-07-28 PROCEDURE — 99204 OFFICE O/P NEW MOD 45 MIN: CPT | Performed by: ANESTHESIOLOGY

## 2025-07-28 ASSESSMENT — PAIN DESCRIPTION - DESCRIPTORS: DESCRIPTORS: BURNING

## 2025-07-28 ASSESSMENT — LIFESTYLE VARIABLES
HOW OFTEN DO YOU HAVE SIX OR MORE DRINKS ON ONE OCCASION: NEVER
AUDIT-C TOTAL SCORE: 0
HOW MANY STANDARD DRINKS CONTAINING ALCOHOL DO YOU HAVE ON A TYPICAL DAY: PATIENT DOES NOT DRINK
HOW OFTEN DO YOU HAVE A DRINK CONTAINING ALCOHOL: NEVER
SKIP TO QUESTIONS 9-10: 1

## 2025-07-28 ASSESSMENT — PATIENT HEALTH QUESTIONNAIRE - PHQ9
1. LITTLE INTEREST OR PLEASURE IN DOING THINGS: NOT AT ALL
SUM OF ALL RESPONSES TO PHQ9 QUESTIONS 1 & 2: 0
2. FEELING DOWN, DEPRESSED OR HOPELESS: NOT AT ALL

## 2025-07-28 ASSESSMENT — PAIN SCALES - GENERAL
PAINLEVEL_OUTOF10: 8
PAINLEVEL_OUTOF10: 8

## 2025-07-28 ASSESSMENT — PAIN - FUNCTIONAL ASSESSMENT: PAIN_FUNCTIONAL_ASSESSMENT: 0-10

## 2025-07-28 ASSESSMENT — ENCOUNTER SYMPTOMS
NUMBNESS: 1
ACTIVITY CHANGE: 1

## 2025-07-28 NOTE — PROGRESS NOTES
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  The patient reports that up until a year ago her pain had plateaued and was tolerable.  Unfortunately the pain became worse a year ago.  The pain is no longer tolerable.  The pain started in the toes and has worked its way to the ankles.  She describes the pain as burning.  She also describes tightness.  Her quality of life is unacceptable.  She has tried gabapentin but had a bad reaction.  She has never tried pregabalin.  She was referred here to discuss Calmare therapy.    Review of Systems   Constitutional:  Positive for activity change.   Musculoskeletal:  Positive for gait problem.   Neurological:  Positive for numbness.   All other systems reviewed and are negative.    GENERAL: alert and appropriate, in no distress, well-hydrated, well nourished, interactive         SKIN: no rash noted, discoloration of the bilateral digits of the feet         HEAD: normocephalic, no abnormality or lesion noted         EYES: no injection and visual acuity is grossly normal         EARS: external ears normal, no mastoid tenderness         NOSE: external nose normal without rhinorrhea         OROPHARYNX: moist mucus membranes, no tonsillar hypertrophy/exudate, uvula midline and pharynx non-erythematous, lips, teeth and gums are without obvious lesion         NECK: Adequate ROM, no cervical LNs noted         RESPIRATORY: breathing non-labored and no grunting/flaring/retractions         CHEST: equal chest rise with normal respiratory effort         ABDOMEN: soft and non-tender         BACK: back normal in appearance, cervical and lumbar spine with adequate ROM         EXTREMITIES: strength intact         NEUROLOGIC: gait antalgic, SLR negative, sensation grossly intact    Assessment and Plan    -Chronicity--chronic neuropathic pain    -Diagnostics--no new  imaging    -Pharmacologic--no change    -Psychologic--no need for psychologic intervention from my standpoint.  There are no mental health issues of which I am aware that are contributing to the patient's pain.  There are no substance abuse or alcohol abuse issues of which I am aware that are contributing to the patient's pain.    -Physical--we discussed the importance of physical therapy and exercise.  We discussed avoidance and modification techniques.    -Intervention--the patient is a candidate for Calmare therapy bilaterally at the L5 and S1 levels.  The patient does not take any gabapentinoid medications.    I spent time educating the patient on the condition including the treatment and the prognosis.  I invited the patient to call at anytime with any questions.

## 2025-08-04 DIAGNOSIS — R13.12 OROPHARYNGEAL DYSPHAGIA: ICD-10-CM

## 2025-08-04 RX ORDER — HYDROGEN PEROXIDE 3 %
20 SOLUTION, NON-ORAL MISCELLANEOUS
Qty: 30 CAPSULE | Refills: 2 | Status: SHIPPED | OUTPATIENT
Start: 2025-08-04 | End: 2025-11-02

## 2025-08-29 ENCOUNTER — APPOINTMENT (OUTPATIENT)
Dept: INTEGRATIVE MEDICINE | Facility: CLINIC | Age: 80
End: 2025-08-29

## 2025-08-29 DIAGNOSIS — G62.9 NEUROPATHY: Primary | ICD-10-CM

## 2025-08-29 PROCEDURE — ACUGR GROUP ACUPUNCTURE

## 2025-09-05 ENCOUNTER — APPOINTMENT (OUTPATIENT)
Dept: INTEGRATIVE MEDICINE | Facility: CLINIC | Age: 80
End: 2025-09-05

## 2025-09-12 ENCOUNTER — APPOINTMENT (OUTPATIENT)
Dept: CARDIOLOGY | Facility: CLINIC | Age: 80
End: 2025-09-12
Payer: MEDICARE

## 2025-09-15 ENCOUNTER — APPOINTMENT (OUTPATIENT)
Dept: INTEGRATIVE MEDICINE | Facility: CLINIC | Age: 80
End: 2025-09-15

## 2025-09-22 ENCOUNTER — APPOINTMENT (OUTPATIENT)
Dept: INTEGRATIVE MEDICINE | Facility: CLINIC | Age: 80
End: 2025-09-22

## 2025-09-25 ENCOUNTER — APPOINTMENT (OUTPATIENT)
Dept: CARDIOLOGY | Facility: CLINIC | Age: 80
End: 2025-09-25
Payer: MEDICARE

## 2025-09-29 ENCOUNTER — APPOINTMENT (OUTPATIENT)
Dept: INTEGRATIVE MEDICINE | Facility: CLINIC | Age: 80
End: 2025-09-29

## 2025-10-22 ENCOUNTER — APPOINTMENT (OUTPATIENT)
Dept: INTEGRATIVE MEDICINE | Facility: CLINIC | Age: 80
End: 2025-10-22
Payer: MEDICARE

## 2025-11-10 ENCOUNTER — APPOINTMENT (OUTPATIENT)
Dept: OBSTETRICS AND GYNECOLOGY | Facility: CLINIC | Age: 80
End: 2025-11-10
Payer: MEDICARE

## 2025-12-04 ENCOUNTER — APPOINTMENT (OUTPATIENT)
Facility: CLINIC | Age: 80
End: 2025-12-04
Payer: MEDICARE

## 2026-06-08 ENCOUNTER — APPOINTMENT (OUTPATIENT)
Dept: CARDIOLOGY | Facility: CLINIC | Age: 81
End: 2026-06-08
Payer: MEDICARE

## 2026-07-02 ENCOUNTER — APPOINTMENT (OUTPATIENT)
Dept: CARDIOLOGY | Facility: CLINIC | Age: 81
End: 2026-07-02
Payer: MEDICARE